# Patient Record
Sex: FEMALE | Race: WHITE | NOT HISPANIC OR LATINO | Employment: FULL TIME | RURAL
[De-identification: names, ages, dates, MRNs, and addresses within clinical notes are randomized per-mention and may not be internally consistent; named-entity substitution may affect disease eponyms.]

---

## 2020-07-07 ENCOUNTER — HISTORICAL (OUTPATIENT)
Dept: ADMINISTRATIVE | Facility: HOSPITAL | Age: 70
End: 2020-07-07

## 2020-09-22 ENCOUNTER — HISTORICAL (OUTPATIENT)
Dept: ADMINISTRATIVE | Facility: HOSPITAL | Age: 70
End: 2020-09-22

## 2020-12-16 ENCOUNTER — HISTORICAL (OUTPATIENT)
Dept: ADMINISTRATIVE | Facility: HOSPITAL | Age: 70
End: 2020-12-16

## 2020-12-21 ENCOUNTER — HISTORICAL (OUTPATIENT)
Dept: ADMINISTRATIVE | Facility: HOSPITAL | Age: 70
End: 2020-12-21

## 2021-01-05 ENCOUNTER — HISTORICAL (OUTPATIENT)
Dept: ADMINISTRATIVE | Facility: HOSPITAL | Age: 71
End: 2021-01-05

## 2021-01-07 LAB
DSDNA IGG SERPL IA-ACNC: 14 IU (ref 0–24)
DSDNA IGG SERPL IA-ACNC: NEGATIVE [IU]/ML
ENA AB SER QL IA: 2.8 EUS (ref 0–19.9)
ENA AB SER QL IA: NEGATIVE

## 2021-01-12 ENCOUNTER — HISTORICAL (OUTPATIENT)
Dept: ADMINISTRATIVE | Facility: HOSPITAL | Age: 71
End: 2021-01-12

## 2021-04-05 ENCOUNTER — OFFICE VISIT (OUTPATIENT)
Dept: NEUROLOGY | Facility: CLINIC | Age: 71
End: 2021-04-05
Payer: MEDICARE

## 2021-04-05 VITALS
HEART RATE: 77 BPM | HEIGHT: 66 IN | OXYGEN SATURATION: 97 % | WEIGHT: 137.81 LBS | SYSTOLIC BLOOD PRESSURE: 136 MMHG | DIASTOLIC BLOOD PRESSURE: 70 MMHG | BODY MASS INDEX: 22.15 KG/M2

## 2021-04-05 DIAGNOSIS — E78.5 HYPERLIPIDEMIA, UNSPECIFIED HYPERLIPIDEMIA TYPE: ICD-10-CM

## 2021-04-05 DIAGNOSIS — F80.1 EXPRESSIVE LANGUAGE DISORDER: ICD-10-CM

## 2021-04-05 DIAGNOSIS — G45.9 TIA (TRANSIENT ISCHEMIC ATTACK): Primary | ICD-10-CM

## 2021-04-05 PROCEDURE — 99213 PR OFFICE/OUTPT VISIT, EST, LEVL III, 20-29 MIN: ICD-10-PCS | Mod: S$PBB,,, | Performed by: NURSE PRACTITIONER

## 2021-04-05 PROCEDURE — 99214 OFFICE O/P EST MOD 30 MIN: CPT | Mod: PBBFAC | Performed by: NURSE PRACTITIONER

## 2021-04-05 PROCEDURE — 99999 PR PBB SHADOW E&M-EST. PATIENT-LVL IV: CPT | Mod: PBBFAC,,, | Performed by: NURSE PRACTITIONER

## 2021-04-05 PROCEDURE — 99213 OFFICE O/P EST LOW 20 MIN: CPT | Mod: S$PBB,,, | Performed by: NURSE PRACTITIONER

## 2021-04-05 PROCEDURE — 99999 PR PBB SHADOW E&M-EST. PATIENT-LVL IV: ICD-10-PCS | Mod: PBBFAC,,, | Performed by: NURSE PRACTITIONER

## 2021-04-05 RX ORDER — NAPROXEN SODIUM 220 MG/1
81 TABLET, FILM COATED ORAL DAILY
COMMUNITY

## 2021-04-05 RX ORDER — OMEPRAZOLE 20 MG/1
20 CAPSULE, DELAYED RELEASE ORAL DAILY
COMMUNITY
End: 2021-12-07 | Stop reason: ALTCHOICE

## 2021-04-05 RX ORDER — AZELASTINE 1 MG/ML
2 SPRAY, METERED NASAL 2 TIMES DAILY
COMMUNITY
Start: 2021-03-23

## 2021-04-05 RX ORDER — ZINC ACETATE 50 MG/1
1 CAPSULE ORAL DAILY
COMMUNITY
End: 2022-09-19

## 2021-04-05 RX ORDER — IBUPROFEN 200 MG
1 CAPSULE ORAL DAILY
COMMUNITY

## 2021-04-06 ENCOUNTER — HOSPITAL ENCOUNTER (OUTPATIENT)
Dept: RADIOLOGY | Facility: HOSPITAL | Age: 71
Discharge: HOME | End: 2021-04-06
Attending: NURSE PRACTITIONER
Payer: MEDICARE

## 2021-04-06 DIAGNOSIS — M85.89 OTHER SPECIFIED DISORDERS OF BONE DENSITY AND STRUCTURE, MULTIPLE SITES: ICD-10-CM

## 2021-04-06 PROCEDURE — 77080 DEXA BONE DENSITY SPINE HIP: ICD-10-PCS | Mod: 26,,, | Performed by: RADIOLOGY

## 2021-04-06 PROCEDURE — 77080 DXA BONE DENSITY AXIAL: CPT | Mod: 26,,, | Performed by: RADIOLOGY

## 2021-04-06 PROCEDURE — 77080 DXA BONE DENSITY AXIAL: CPT | Mod: TC

## 2021-07-13 ENCOUNTER — HOSPITAL ENCOUNTER (OUTPATIENT)
Dept: RADIOLOGY | Facility: HOSPITAL | Age: 71
Discharge: HOME | End: 2021-07-13
Payer: MEDICARE

## 2021-07-13 VITALS — HEIGHT: 66 IN | BODY MASS INDEX: 21.69 KG/M2 | WEIGHT: 135 LBS

## 2021-07-13 DIAGNOSIS — Z12.31 VISIT FOR SCREENING MAMMOGRAM: ICD-10-CM

## 2021-07-13 PROCEDURE — 77067 SCR MAMMO BI INCL CAD: CPT | Mod: TC

## 2021-09-27 ENCOUNTER — OFFICE VISIT (OUTPATIENT)
Dept: FAMILY MEDICINE | Facility: CLINIC | Age: 71
End: 2021-09-27
Payer: MEDICARE

## 2021-09-27 VITALS
BODY MASS INDEX: 22.47 KG/M2 | HEART RATE: 70 BPM | SYSTOLIC BLOOD PRESSURE: 120 MMHG | RESPIRATION RATE: 20 BRPM | WEIGHT: 139.81 LBS | OXYGEN SATURATION: 98 % | DIASTOLIC BLOOD PRESSURE: 78 MMHG | TEMPERATURE: 98 F | HEIGHT: 66 IN

## 2021-09-27 DIAGNOSIS — R14.2 BELCHING: ICD-10-CM

## 2021-09-27 DIAGNOSIS — K21.9 GASTROESOPHAGEAL REFLUX DISEASE, UNSPECIFIED WHETHER ESOPHAGITIS PRESENT: ICD-10-CM

## 2021-09-27 DIAGNOSIS — R19.06 EPIGASTRIC FULLNESS: ICD-10-CM

## 2021-09-27 DIAGNOSIS — R13.10 FOOD STICKS ON SWALLOWING: Primary | ICD-10-CM

## 2021-09-27 PROCEDURE — 99213 PR OFFICE/OUTPT VISIT, EST, LEVL III, 20-29 MIN: ICD-10-PCS | Mod: ,,, | Performed by: NURSE PRACTITIONER

## 2021-09-27 PROCEDURE — 99213 OFFICE O/P EST LOW 20 MIN: CPT | Mod: ,,, | Performed by: NURSE PRACTITIONER

## 2021-09-27 RX ORDER — PNV NO.95/FERROUS FUM/FOLIC AC 28MG-0.8MG
100 TABLET ORAL DAILY
COMMUNITY
End: 2022-09-19

## 2021-09-27 RX ORDER — IBUPROFEN 100 MG/5ML
1000 SUSPENSION, ORAL (FINAL DOSE FORM) ORAL DAILY
COMMUNITY
End: 2022-09-19

## 2021-10-12 ENCOUNTER — OFFICE VISIT (OUTPATIENT)
Dept: GASTROENTEROLOGY | Facility: CLINIC | Age: 71
End: 2021-10-12
Payer: MEDICARE

## 2021-10-12 VITALS
BODY MASS INDEX: 22.02 KG/M2 | DIASTOLIC BLOOD PRESSURE: 63 MMHG | WEIGHT: 137 LBS | OXYGEN SATURATION: 97 % | HEIGHT: 66 IN | SYSTOLIC BLOOD PRESSURE: 140 MMHG | HEART RATE: 96 BPM

## 2021-10-12 DIAGNOSIS — R10.13 EPIGASTRIC PAIN: Primary | ICD-10-CM

## 2021-10-12 DIAGNOSIS — Z11.59 SCREENING FOR VIRAL DISEASE: ICD-10-CM

## 2021-10-12 DIAGNOSIS — R19.06 EPIGASTRIC FULLNESS: ICD-10-CM

## 2021-10-12 DIAGNOSIS — R14.2 BELCHING: ICD-10-CM

## 2021-10-12 DIAGNOSIS — K21.9 GASTROESOPHAGEAL REFLUX DISEASE, UNSPECIFIED WHETHER ESOPHAGITIS PRESENT: ICD-10-CM

## 2021-10-12 DIAGNOSIS — R13.10 DYSPHAGIA, UNSPECIFIED TYPE: ICD-10-CM

## 2021-10-12 DIAGNOSIS — K59.00 CONSTIPATION, UNSPECIFIED CONSTIPATION TYPE: ICD-10-CM

## 2021-10-12 PROCEDURE — 99214 PR OFFICE/OUTPT VISIT, EST, LEVL IV, 30-39 MIN: ICD-10-PCS | Mod: ,,, | Performed by: NURSE PRACTITIONER

## 2021-10-12 PROCEDURE — 99214 OFFICE O/P EST MOD 30 MIN: CPT | Mod: ,,, | Performed by: NURSE PRACTITIONER

## 2021-11-08 DIAGNOSIS — Z01.818 PRE-OP TESTING: ICD-10-CM

## 2021-11-15 ENCOUNTER — ANESTHESIA (OUTPATIENT)
Dept: GASTROENTEROLOGY | Facility: HOSPITAL | Age: 71
End: 2021-11-15
Payer: MEDICARE

## 2021-11-15 ENCOUNTER — HOSPITAL ENCOUNTER (OUTPATIENT)
Dept: GASTROENTEROLOGY | Facility: HOSPITAL | Age: 71
Discharge: HOME | End: 2021-11-15
Attending: NURSE PRACTITIONER
Payer: MEDICARE

## 2021-11-15 ENCOUNTER — ANESTHESIA EVENT (OUTPATIENT)
Dept: GASTROENTEROLOGY | Facility: HOSPITAL | Age: 71
End: 2021-11-15
Payer: MEDICARE

## 2021-11-15 VITALS
TEMPERATURE: 98 F | DIASTOLIC BLOOD PRESSURE: 60 MMHG | SYSTOLIC BLOOD PRESSURE: 142 MMHG | HEART RATE: 72 BPM | OXYGEN SATURATION: 99 % | RESPIRATION RATE: 12 BRPM | BODY MASS INDEX: 22.18 KG/M2 | HEIGHT: 66 IN | WEIGHT: 138 LBS

## 2021-11-15 DIAGNOSIS — R68.81 EARLY SATIETY: ICD-10-CM

## 2021-11-15 DIAGNOSIS — R10.13 EPIGASTRIC PAIN: ICD-10-CM

## 2021-11-15 DIAGNOSIS — R14.2 BELCHING: ICD-10-CM

## 2021-11-15 DIAGNOSIS — R13.10 DYSPHAGIA, UNSPECIFIED TYPE: ICD-10-CM

## 2021-11-15 DIAGNOSIS — R13.19 ESOPHAGEAL DYSPHAGIA: ICD-10-CM

## 2021-11-15 PROCEDURE — 25000003 PHARM REV CODE 250: Performed by: NURSE ANESTHETIST, CERTIFIED REGISTERED

## 2021-11-15 PROCEDURE — D9220A PRA ANESTHESIA: ICD-10-PCS | Mod: ,,, | Performed by: NURSE ANESTHETIST, CERTIFIED REGISTERED

## 2021-11-15 PROCEDURE — 88342 SURGICAL PATHOLOGY: ICD-10-PCS | Mod: 26,,, | Performed by: PATHOLOGY

## 2021-11-15 PROCEDURE — 88342 IMHCHEM/IMCYTCHM 1ST ANTB: CPT | Mod: 26,,, | Performed by: PATHOLOGY

## 2021-11-15 PROCEDURE — 63600175 PHARM REV CODE 636 W HCPCS: Performed by: NURSE ANESTHETIST, CERTIFIED REGISTERED

## 2021-11-15 PROCEDURE — C1889 IMPLANT/INSERT DEVICE, NOC: HCPCS

## 2021-11-15 PROCEDURE — 37000009 HC ANESTHESIA EA ADD 15 MINS

## 2021-11-15 PROCEDURE — 88305 TISSUE EXAM BY PATHOLOGIST: CPT | Mod: SUR | Performed by: INTERNAL MEDICINE

## 2021-11-15 PROCEDURE — D9220A PRA ANESTHESIA: Mod: ,,, | Performed by: NURSE ANESTHETIST, CERTIFIED REGISTERED

## 2021-11-15 PROCEDURE — 88305 TISSUE EXAM BY PATHOLOGIST: CPT | Mod: 26,,, | Performed by: PATHOLOGY

## 2021-11-15 PROCEDURE — 27201423 OPTIME MED/SURG SUP & DEVICES STERILE SUPPLY

## 2021-11-15 PROCEDURE — 43239 EGD BIOPSY SINGLE/MULTIPLE: CPT

## 2021-11-15 PROCEDURE — 37000008 HC ANESTHESIA 1ST 15 MINUTES

## 2021-11-15 PROCEDURE — 43450 DILATE ESOPHAGUS 1/MULT PASS: CPT

## 2021-11-15 PROCEDURE — 88305 SURGICAL PATHOLOGY: ICD-10-PCS | Mod: 26,XU,, | Performed by: PATHOLOGY

## 2021-11-15 RX ORDER — LIDOCAINE HYDROCHLORIDE 20 MG/ML
INJECTION INTRAVENOUS
Status: DISCONTINUED | OUTPATIENT
Start: 2021-11-15 | End: 2021-11-15

## 2021-11-15 RX ORDER — PROPOFOL 10 MG/ML
VIAL (ML) INTRAVENOUS
Status: DISCONTINUED | OUTPATIENT
Start: 2021-11-15 | End: 2021-11-15

## 2021-11-15 RX ORDER — SODIUM CHLORIDE 0.9 % (FLUSH) 0.9 %
10 SYRINGE (ML) INJECTION
Status: DISCONTINUED | OUTPATIENT
Start: 2021-11-15 | End: 2021-11-16 | Stop reason: HOSPADM

## 2021-11-15 RX ADMIN — PROPOFOL 50 MG: 10 INJECTION, EMULSION INTRAVENOUS at 09:11

## 2021-11-15 RX ADMIN — PROPOFOL 100 MG: 10 INJECTION, EMULSION INTRAVENOUS at 09:11

## 2021-11-15 RX ADMIN — SODIUM CHLORIDE: 9 INJECTION, SOLUTION INTRAVENOUS at 09:11

## 2021-11-15 RX ADMIN — LIDOCAINE HYDROCHLORIDE 50 MG: 20 INJECTION, SOLUTION INTRAVENOUS at 09:11

## 2021-11-16 LAB
ESTROGEN SERPL-MCNC: NORMAL PG/ML
LAB AP GROSS DESCRIPTION: NORMAL
LAB AP LABORATORY NOTES: NORMAL
T3RU NFR SERPL: NORMAL %

## 2021-11-17 ENCOUNTER — TELEPHONE (OUTPATIENT)
Dept: GASTROENTEROLOGY | Facility: CLINIC | Age: 71
End: 2021-11-17
Payer: MEDICARE

## 2021-12-07 ENCOUNTER — OFFICE VISIT (OUTPATIENT)
Dept: GASTROENTEROLOGY | Facility: CLINIC | Age: 71
End: 2021-12-07
Payer: MEDICARE

## 2021-12-07 VITALS
DIASTOLIC BLOOD PRESSURE: 74 MMHG | WEIGHT: 137 LBS | SYSTOLIC BLOOD PRESSURE: 141 MMHG | OXYGEN SATURATION: 98 % | HEIGHT: 66 IN | BODY MASS INDEX: 22.02 KG/M2 | HEART RATE: 91 BPM

## 2021-12-07 DIAGNOSIS — K21.9 GASTROESOPHAGEAL REFLUX DISEASE, UNSPECIFIED WHETHER ESOPHAGITIS PRESENT: ICD-10-CM

## 2021-12-07 DIAGNOSIS — R10.13 EPIGASTRIC PAIN: Primary | ICD-10-CM

## 2021-12-07 DIAGNOSIS — R68.81 EARLY SATIETY: ICD-10-CM

## 2021-12-07 DIAGNOSIS — K59.00 CONSTIPATION, UNSPECIFIED CONSTIPATION TYPE: ICD-10-CM

## 2021-12-07 DIAGNOSIS — R14.2 BELCHING: ICD-10-CM

## 2021-12-07 PROCEDURE — 99214 OFFICE O/P EST MOD 30 MIN: CPT | Mod: ,,, | Performed by: NURSE PRACTITIONER

## 2021-12-07 PROCEDURE — 99214 PR OFFICE/OUTPT VISIT, EST, LEVL IV, 30-39 MIN: ICD-10-PCS | Mod: ,,, | Performed by: NURSE PRACTITIONER

## 2021-12-07 RX ORDER — PNV NO.95/FERROUS FUM/FOLIC AC 28MG-0.8MG
1000 TABLET ORAL DAILY
COMMUNITY
End: 2022-09-19

## 2021-12-07 RX ORDER — PANTOPRAZOLE SODIUM 40 MG/1
40 TABLET, DELAYED RELEASE ORAL DAILY
Qty: 30 TABLET | Refills: 11 | Status: SHIPPED | OUTPATIENT
Start: 2021-12-07 | End: 2022-09-19

## 2021-12-07 RX ORDER — NICOTINE POLACRILEX 2 MG
GUM BUCCAL DAILY
COMMUNITY
End: 2022-09-19

## 2021-12-21 ENCOUNTER — HOSPITAL ENCOUNTER (OUTPATIENT)
Dept: RADIOLOGY | Facility: HOSPITAL | Age: 71
Discharge: HOME | End: 2021-12-21
Attending: INTERNAL MEDICINE
Payer: MEDICARE

## 2021-12-21 ENCOUNTER — TELEPHONE (OUTPATIENT)
Dept: GASTROENTEROLOGY | Facility: CLINIC | Age: 71
End: 2021-12-21
Payer: MEDICARE

## 2021-12-21 DIAGNOSIS — R14.2 BELCHING: ICD-10-CM

## 2021-12-21 DIAGNOSIS — R10.13 EPIGASTRIC PAIN: ICD-10-CM

## 2021-12-21 PROCEDURE — 76700 US EXAM ABDOM COMPLETE: CPT | Mod: 26,,, | Performed by: RADIOLOGY

## 2021-12-21 PROCEDURE — 76700 US EXAM ABDOM COMPLETE: CPT | Mod: TC

## 2021-12-21 PROCEDURE — 76700 US ABDOMEN COMPLETE: ICD-10-PCS | Mod: 26,,, | Performed by: RADIOLOGY

## 2022-01-11 ENCOUNTER — OFFICE VISIT (OUTPATIENT)
Dept: GASTROENTEROLOGY | Facility: CLINIC | Age: 72
End: 2022-01-11
Payer: MEDICARE

## 2022-01-11 VITALS
SYSTOLIC BLOOD PRESSURE: 132 MMHG | BODY MASS INDEX: 21.69 KG/M2 | OXYGEN SATURATION: 98 % | WEIGHT: 135 LBS | DIASTOLIC BLOOD PRESSURE: 50 MMHG | HEIGHT: 66 IN | HEART RATE: 88 BPM

## 2022-01-11 DIAGNOSIS — R68.81 EARLY SATIETY: ICD-10-CM

## 2022-01-11 DIAGNOSIS — K21.9 GASTROESOPHAGEAL REFLUX DISEASE, UNSPECIFIED WHETHER ESOPHAGITIS PRESENT: Primary | ICD-10-CM

## 2022-01-11 DIAGNOSIS — R19.06 EPIGASTRIC FULLNESS: ICD-10-CM

## 2022-01-11 PROCEDURE — 99214 PR OFFICE/OUTPT VISIT, EST, LEVL IV, 30-39 MIN: ICD-10-PCS | Mod: ,,, | Performed by: NURSE PRACTITIONER

## 2022-01-11 PROCEDURE — 99214 OFFICE O/P EST MOD 30 MIN: CPT | Mod: ,,, | Performed by: NURSE PRACTITIONER

## 2022-01-11 RX ORDER — OMEPRAZOLE 20 MG/1
20 CAPSULE, DELAYED RELEASE ORAL DAILY
Qty: 30 CAPSULE | Refills: 11 | Status: SHIPPED | OUTPATIENT
Start: 2022-01-11 | End: 2022-11-01 | Stop reason: DRUGHIGH

## 2022-01-11 NOTE — PROGRESS NOTES
Lynne Grimaldo is a 71 y.o. female here for No chief complaint on file.        PCP: Kristyn Tavera  Referring Provider: No referring provider defined for this encounter.     HPI:  Presents for follow up epigastric discomfort and fullness. She was changed to Protonix at the last visit. She states that the Protonix caused her to have nausea. States that she was waking up at night with nausea. She stopped Protonix and started Omeprazole. Does continue to have epigastric discomfort that is increased after eating. Discussed with patient small frequent meals and avoiding raw fruit and vegetables. Does have reflux. Abdominal ultrasound normal. No gallstones. Reports constipation is controlled with taking Miralax powder. Denies hematochezia and melena.        ROS:  Review of Systems   Constitutional: Negative for appetite change, fatigue, fever and unexpected weight change.   HENT: Negative for trouble swallowing.    Respiratory: Negative for shortness of breath and wheezing.    Cardiovascular: Negative for chest pain and palpitations.   Gastrointestinal: Positive for abdominal pain (epigastric fullness), constipation (takes Miralax) and reflux. Negative for blood in stool, change in bowel habit, diarrhea, nausea, vomiting, fecal incontinence and change in bowel habit.   Genitourinary: Negative for dysuria and urgency.   Musculoskeletal: Negative for back pain, gait problem and joint swelling.   Integumentary:  Negative for pallor.   Neurological: Negative for dizziness, weakness and light-headedness.   Hematological: Does not bruise/bleed easily.   Psychiatric/Behavioral: The patient is not nervous/anxious.           PMHX:  has a past medical history of Back pain, COVID-19 (07/2021), GERD (gastroesophageal reflux disease), Hyperlipidemia, and Stroke.    PSHX:  has a past surgical history that includes Appendectomy and Back surgery.    PFHX: family history includes Arthritis in her maternal grandfather; Cancer  "in her father; Diabetes in her brother; No Known Problems in her sister; Spine Surgery in her mother.    PSlHX:  reports that she has never smoked. She has never used smokeless tobacco. She reports previous alcohol use. She reports that she does not use drugs.        Review of patient's allergies indicates:   Allergen Reactions    Sulfa (sulfonamide antibiotics) Hives and Edema       Medication List with Changes/Refills   New Medications    OMEPRAZOLE (PRILOSEC) 20 MG CAPSULE    Take 1 capsule (20 mg total) by mouth once daily.   Current Medications    ASCORBIC ACID, VITAMIN C, (VITAMIN C) 1000 MG TABLET    Take 1,000 mg by mouth once daily.    ASPIRIN 81 MG CHEW    Take 81 mg by mouth once daily.    AZELASTINE (ASTELIN) 137 MCG (0.1 %) NASAL SPRAY        BIOTIN 1 MG CAP    Take by mouth once daily.    CALCIUM CITRATE (CALCITRATE) 200 MG (950 MG) TABLET    Take 2 tablets by mouth 2 (two) times daily.     CHOLECALCIFEROL, VITAMIN D3, ORAL    Take 1 capsule by mouth once daily.    CYANOCOBALAMIN (VITAMIN B-12) 100 MCG TABLET    Take 100 mcg by mouth once daily.    MULTIVITAMIN CAPSULE    Take 1 capsule by mouth once daily.    PANTOPRAZOLE (PROTONIX) 40 MG TABLET    Take 1 tablet (40 mg total) by mouth once daily.    SOY ISOFL/BLK COH/GR TEA/YERBA (ESTROVEN ENERGY ORAL)    Take 1 tablet by mouth once daily.    VITAMIN E 1000 UNIT CAPSULE    Take 1,000 Units by mouth once daily.    ZINC ACETATE (GALZIN) 50 MG (ZINC) CAP    Take 1 capsule by mouth once daily.        Objective Findings:  Vital Signs:  BP (!) 132/50   Pulse 88   Ht 5' 6" (1.676 m)   Wt 61.2 kg (135 lb)   SpO2 98%   BMI 21.79 kg/m²  Body mass index is 21.79 kg/m².    Physical Exam:  Physical Exam  Vitals and nursing note reviewed.   Constitutional:       General: She is not in acute distress.     Appearance: Normal appearance. She is not ill-appearing.   HENT:      Mouth/Throat:      Mouth: Mucous membranes are moist.   Eyes:      General: No scleral " icterus.  Cardiovascular:      Rate and Rhythm: Normal rate and regular rhythm.      Heart sounds: No murmur heard.      Pulmonary:      Effort: Pulmonary effort is normal.      Breath sounds: No wheezing, rhonchi or rales.   Abdominal:      General: Bowel sounds are normal. There is no distension.      Palpations: Abdomen is soft. There is no mass.      Tenderness: There is no abdominal tenderness. There is no guarding or rebound.      Hernia: No hernia is present.   Musculoskeletal:      Right lower leg: No edema.      Left lower leg: No edema.   Skin:     General: Skin is warm and dry.      Coloration: Skin is not jaundiced or pale.      Findings: No bruising or rash.   Neurological:      Mental Status: She is alert and oriented to person, place, and time.   Psychiatric:         Mood and Affect: Mood normal.          Labs:  Lab Results   Component Value Date    WBC 6.23 10/12/2021    HGB 14.0 10/12/2021    HCT 42.7 10/12/2021    MCV 89.9 10/12/2021    RDW 13.2 10/12/2021     10/12/2021    LYMPH 29.5 10/12/2021    LYMPH 1.84 10/12/2021    MONO 8.2 (H) 10/12/2021    EOS 0.25 10/12/2021    BASO 0.07 10/12/2021     Lab Results   Component Value Date     10/12/2021    K 3.8 10/12/2021     10/12/2021    CO2 31 10/12/2021     (H) 10/12/2021    BUN 26 (H) 10/12/2021    CREATININE 1.49 (H) 10/12/2021    CALCIUM 10.2 (H) 10/12/2021    PROT 7.6 10/12/2021    ALBUMIN 4.2 10/12/2021    BILITOT 0.4 10/12/2021    ALKPHOS 113 10/12/2021    AST 26 10/12/2021    ALT 28 10/12/2021         Imaging: US Abdomen Complete    Result Date: 12/21/2021  EXAMINATION: US ABDOMEN COMPLETE CLINICAL HISTORY: Epigastric pain COMPARISON: Renal ultrasound August 29, 2018 TECHNIQUE: Multiple longitudinal and transverse real-time sonographic images of the abdomen are obtained. FINDINGS: The liver measures 12.9 cm and demonstrates normal  echogenicity without focal abnormality on submitted images. The gallbladder demonstrates  no significant abnormal distention, abnormal intraluminal echoes, wall thickening, or pericholecystic fluid. The common duct measures 0.3 cm in diameter. There is no evidence of intrahepatic ductal dilatation. The right and left kidneys measure 10.7 cm and 10.0 cm, respectively. There is no hydronephrosis. The spleen measures 9.3 cm without focal abnormality. Evaluation of the pancreas limited secondary to bowel gas. IVC and aorta: Visualized portions grossly unremarkable. No evidence of ascites.     No convincing sonographic evidence of significant abdominal abnormality. Point of Service: [facility] Electronically signed by: Edward Fermin Date:    12/21/2021 Time:    09:29        Assessment:  Lynne Grimaldo is a 71 y.o. female here with:  1. Gastroesophageal reflux disease, unspecified whether esophagitis present    2. Epigastric fullness    3. Early satiety          Recommendations:  1. Schedule Hida scan. (abdominal ultrasound normal)  2. Continue Omeprazole 20 mg daily  3. Small frequent meals. Do not lay down within 3 hours of eating. Avoid raw fruits and vegetables. No spicy, greasy, or high fat foods    Follow up in about 2 months (around 3/11/2022).      Order summary:  Orders Placed This Encounter    NM Hepatobiliary Scan W Pharm Intervention    omeprazole (PRILOSEC) 20 MG capsule       Thank you for allowing me to participate in the care of Lynne Grimaldo.      SHANNAN Grayson

## 2022-01-15 ENCOUNTER — OFFICE VISIT (OUTPATIENT)
Dept: FAMILY MEDICINE | Facility: CLINIC | Age: 72
End: 2022-01-15
Payer: MEDICARE

## 2022-01-15 VITALS
HEIGHT: 63 IN | TEMPERATURE: 98 F | DIASTOLIC BLOOD PRESSURE: 82 MMHG | HEART RATE: 96 BPM | OXYGEN SATURATION: 99 % | WEIGHT: 139 LBS | BODY MASS INDEX: 24.63 KG/M2 | SYSTOLIC BLOOD PRESSURE: 140 MMHG

## 2022-01-15 DIAGNOSIS — U07.1 COVID-19: Primary | ICD-10-CM

## 2022-01-15 DIAGNOSIS — J40 BRONCHITIS: ICD-10-CM

## 2022-01-15 PROCEDURE — 99213 OFFICE O/P EST LOW 20 MIN: CPT | Mod: 25,CR,, | Performed by: NURSE PRACTITIONER

## 2022-01-15 PROCEDURE — 99213 PR OFFICE/OUTPT VISIT, EST, LEVL III, 20-29 MIN: ICD-10-PCS | Mod: 25,CR,, | Performed by: NURSE PRACTITIONER

## 2022-01-15 PROCEDURE — 96372 THER/PROPH/DIAG INJ SC/IM: CPT | Mod: CR,,, | Performed by: NURSE PRACTITIONER

## 2022-01-15 PROCEDURE — 96372 PR INJECTION,THERAP/PROPH/DIAG2ST, IM OR SUBCUT: ICD-10-PCS | Mod: CR,,, | Performed by: NURSE PRACTITIONER

## 2022-01-15 RX ORDER — CODEINE PHOSPHATE AND GUAIFENESIN 10; 100 MG/5ML; MG/5ML
SOLUTION ORAL
Qty: 120 ML | Refills: 0 | Status: SHIPPED | OUTPATIENT
Start: 2022-01-15 | End: 2022-11-01

## 2022-01-15 RX ORDER — KETOROLAC TROMETHAMINE 30 MG/ML
30 INJECTION, SOLUTION INTRAMUSCULAR; INTRAVENOUS
Status: COMPLETED | OUTPATIENT
Start: 2022-01-15 | End: 2022-01-15

## 2022-01-15 RX ORDER — DEXAMETHASONE SODIUM PHOSPHATE 4 MG/ML
4 INJECTION, SOLUTION INTRA-ARTICULAR; INTRALESIONAL; INTRAMUSCULAR; INTRAVENOUS; SOFT TISSUE
Status: COMPLETED | OUTPATIENT
Start: 2022-01-15 | End: 2022-01-15

## 2022-01-15 RX ORDER — CEFDINIR 300 MG/1
300 CAPSULE ORAL 2 TIMES DAILY
Qty: 20 CAPSULE | Refills: 0 | Status: SHIPPED | OUTPATIENT
Start: 2022-01-15 | End: 2022-01-25

## 2022-01-15 RX ADMIN — KETOROLAC TROMETHAMINE 30 MG: 30 INJECTION, SOLUTION INTRAMUSCULAR; INTRAVENOUS at 10:01

## 2022-01-15 RX ADMIN — DEXAMETHASONE SODIUM PHOSPHATE 4 MG: 4 INJECTION, SOLUTION INTRA-ARTICULAR; INTRALESIONAL; INTRAMUSCULAR; INTRAVENOUS; SOFT TISSUE at 10:01

## 2022-01-15 NOTE — PROGRESS NOTES
"Subjective:       Patient ID: Lynne Grimaldo is a 71 y.o. female.    Chief Complaint: Tested positive for Covid19 Wednesday, Sore Throat, and Cough    Presents to clinic as above. Her cough is severe. No SOB. Has wheezing. Hx of asthma. Strongly wants an antibiotic. Inst the viruses do not respond to antibiotics. States thinks has a secondary infection.  got a Zpack for Covid. Discussed risks of using antibiotic when not needed. Willing to take the risk.    Review of Systems   Constitutional: Positive for chills, fever and malaise/fatigue.   HENT: Positive for congestion and sore throat. Negative for ear pain.    Respiratory: Positive for cough. Negative for hemoptysis, sputum production, shortness of breath and wheezing.    Cardiovascular: Negative.    Gastrointestinal: Negative.    Musculoskeletal: Positive for myalgias.   Neurological: Positive for headaches.          Reviewed family, medical, surgical, and social history.    Objective:      BP (!) 140/82   Pulse 96   Temp 97.6 °F (36.4 °C)   Ht 5' 3" (1.6 m)   Wt 63 kg (139 lb)   SpO2 99%   BMI 24.62 kg/m²   Physical Exam  Vitals and nursing note reviewed.   Constitutional:       General: She is not in acute distress.     Appearance: Normal appearance. She is not ill-appearing or diaphoretic.   HENT:      Head: Normocephalic.      Right Ear: Tympanic membrane, ear canal and external ear normal.      Left Ear: Tympanic membrane, ear canal and external ear normal.      Nose: Mucosal edema, congestion and rhinorrhea present. Rhinorrhea is clear.      Right Turbinates: Enlarged and swollen.      Left Turbinates: Enlarged and swollen.      Mouth/Throat:      Lips: Pink.      Mouth: Mucous membranes are moist.      Pharynx: Oropharynx is clear.   Cardiovascular:      Rate and Rhythm: Normal rate and regular rhythm.      Pulses: Normal pulses.      Heart sounds: Normal heart sounds.   Pulmonary:      Effort: Pulmonary effort is normal.      Breath " sounds: Wheezing and rhonchi present.   Musculoskeletal:      Cervical back: Normal range of motion and neck supple.   Skin:     General: Skin is warm and dry.      Capillary Refill: Capillary refill takes less than 2 seconds.   Neurological:      General: No focal deficit present.      Mental Status: She is alert and oriented to person, place, and time.   Psychiatric:         Mood and Affect: Mood normal.         Behavior: Behavior normal.         Thought Content: Thought content normal.         Judgment: Judgment normal.            No visits with results within 1 Day(s) from this visit.   Latest known visit with results is:   Hospital Outpatient Visit on 11/15/2021   Component Date Value Ref Range Status    Final Diagnosis 11/15/2021    Final                    Value:This result contains rich text formatting which cannot be displayed here.    Gross Description 11/15/2021    Final                    Value:This result contains rich text formatting which cannot be displayed here.    Microscopic Description 11/15/2021    Final                    Value:This result contains rich text formatting which cannot be displayed here.    Laboratory Notes 11/15/2021    Final                    Value:This result contains rich text formatting which cannot be displayed here.      Assessment:       1. COVID-19    2. Bronchitis        Plan:       COVID-19  -     dexamethasone injection 4 mg  -     ketorolac injection 30 mg  -     guaiFENesin-codeine 100-10 mg/5 ml (TUSSI-ORGANIDIN NR)  mg/5 mL syrup; Take 1-2 tsp by mouth q 6 hours prn cough  Dispense: 120 mL; Refill: 0    Bronchitis  -     cefdinir (OMNICEF) 300 MG capsule; Take 1 capsule (300 mg total) by mouth 2 (two) times daily. for 10 days  Dispense: 20 capsule; Refill: 0  -     dexamethasone injection 4 mg  -     ketorolac injection 30 mg  -     guaiFENesin-codeine 100-10 mg/5 ml (TUSSI-ORGANIDIN NR)  mg/5 mL syrup; Take 1-2 tsp by mouth q 6 hours prn cough   Dispense: 120 mL; Refill: 0    Continue quarantine  OTC meds for symptomatic relief  Drink plenty of fluids  Go to ER with any respiratory distress  Copy of result and work/school note given. Copy of these instructions given  RTC PRN          Risks, benefits, and side effects were discussed with the patient. All questions were answered to the fullest satisfaction of the patient, and pt verbalized understanding and agreement to treatment plan. Pt was to call with any new or worsening symptoms, or present to the ER.

## 2022-01-17 ENCOUNTER — HOSPITAL ENCOUNTER (OUTPATIENT)
Dept: RADIOLOGY | Facility: HOSPITAL | Age: 72
Discharge: HOME | End: 2022-01-17
Attending: NURSE PRACTITIONER
Payer: MEDICARE

## 2022-01-17 DIAGNOSIS — R19.06 EPIGASTRIC FULLNESS: ICD-10-CM

## 2022-01-17 DIAGNOSIS — K21.9 GASTROESOPHAGEAL REFLUX DISEASE, UNSPECIFIED WHETHER ESOPHAGITIS PRESENT: ICD-10-CM

## 2022-01-17 DIAGNOSIS — R68.81 EARLY SATIETY: ICD-10-CM

## 2022-01-17 PROCEDURE — 78227 NM HEPATOBILIARY(HIDA) WITH PHARM AND EF: ICD-10-PCS | Mod: 26,,, | Performed by: STUDENT IN AN ORGANIZED HEALTH CARE EDUCATION/TRAINING PROGRAM

## 2022-01-17 PROCEDURE — A9537 TC99M MEBROFENIN: HCPCS

## 2022-01-17 PROCEDURE — 78227 HEPATOBIL SYST IMAGE W/DRUG: CPT | Mod: 26,,, | Performed by: STUDENT IN AN ORGANIZED HEALTH CARE EDUCATION/TRAINING PROGRAM

## 2022-01-20 ENCOUNTER — TELEPHONE (OUTPATIENT)
Dept: GASTROENTEROLOGY | Facility: CLINIC | Age: 72
End: 2022-01-20
Payer: MEDICARE

## 2022-01-20 NOTE — TELEPHONE ENCOUNTER
Called HIDA scan results. Patient verbalzied understanding.      ----- Message from SHANNAN Nolan sent at 1/17/2022 12:01 PM CST -----  Gallbladder function is normal.

## 2022-01-31 ENCOUNTER — OFFICE VISIT (OUTPATIENT)
Dept: FAMILY MEDICINE | Facility: CLINIC | Age: 72
End: 2022-01-31
Payer: MEDICARE

## 2022-01-31 VITALS
SYSTOLIC BLOOD PRESSURE: 132 MMHG | BODY MASS INDEX: 24.21 KG/M2 | WEIGHT: 136.63 LBS | RESPIRATION RATE: 20 BRPM | DIASTOLIC BLOOD PRESSURE: 78 MMHG | HEART RATE: 85 BPM | OXYGEN SATURATION: 97 % | TEMPERATURE: 98 F | HEIGHT: 63 IN

## 2022-01-31 DIAGNOSIS — J32.9 SINUSITIS, UNSPECIFIED CHRONICITY, UNSPECIFIED LOCATION: Primary | ICD-10-CM

## 2022-01-31 PROCEDURE — 96372 THER/PROPH/DIAG INJ SC/IM: CPT | Mod: ,,, | Performed by: NURSE PRACTITIONER

## 2022-01-31 PROCEDURE — 99213 OFFICE O/P EST LOW 20 MIN: CPT | Mod: ,,, | Performed by: NURSE PRACTITIONER

## 2022-01-31 PROCEDURE — 96372 PR INJECTION,THERAP/PROPH/DIAG2ST, IM OR SUBCUT: ICD-10-PCS | Mod: ,,, | Performed by: NURSE PRACTITIONER

## 2022-01-31 PROCEDURE — 99213 PR OFFICE/OUTPT VISIT, EST, LEVL III, 20-29 MIN: ICD-10-PCS | Mod: ,,, | Performed by: NURSE PRACTITIONER

## 2022-01-31 RX ORDER — AMOXICILLIN AND CLAVULANATE POTASSIUM 875; 125 MG/1; MG/1
1 TABLET, FILM COATED ORAL 2 TIMES DAILY
Qty: 20 TABLET | Refills: 0 | Status: SHIPPED | OUTPATIENT
Start: 2022-01-31 | End: 2022-02-10

## 2022-01-31 RX ORDER — CEFTRIAXONE 1 G/1
1 INJECTION, POWDER, FOR SOLUTION INTRAMUSCULAR; INTRAVENOUS
Status: COMPLETED | OUTPATIENT
Start: 2022-01-31 | End: 2022-01-31

## 2022-01-31 RX ORDER — METHYLPREDNISOLONE ACETATE 40 MG/ML
40 INJECTION, SUSPENSION INTRA-ARTICULAR; INTRALESIONAL; INTRAMUSCULAR; SOFT TISSUE
Status: COMPLETED | OUTPATIENT
Start: 2022-01-31 | End: 2022-01-31

## 2022-01-31 RX ORDER — DEXAMETHASONE SODIUM PHOSPHATE 4 MG/ML
4 INJECTION, SOLUTION INTRA-ARTICULAR; INTRALESIONAL; INTRAMUSCULAR; INTRAVENOUS; SOFT TISSUE
Status: COMPLETED | OUTPATIENT
Start: 2022-01-31 | End: 2022-01-31

## 2022-01-31 RX ADMIN — METHYLPREDNISOLONE ACETATE 40 MG: 40 INJECTION, SUSPENSION INTRA-ARTICULAR; INTRALESIONAL; INTRAMUSCULAR; SOFT TISSUE at 11:01

## 2022-01-31 RX ADMIN — CEFTRIAXONE 1 G: 1 INJECTION, POWDER, FOR SOLUTION INTRAMUSCULAR; INTRAVENOUS at 11:01

## 2022-01-31 RX ADMIN — DEXAMETHASONE SODIUM PHOSPHATE 4 MG: 4 INJECTION, SOLUTION INTRA-ARTICULAR; INTRALESIONAL; INTRAMUSCULAR; INTRAVENOUS; SOFT TISSUE at 11:01

## 2022-01-31 NOTE — PROGRESS NOTES
Subjective:       Patient ID: Lynne Grimaldo is a 71 y.o. female.    Chief Complaint: URI (Covid + 3 weeks ago. Never got better. Congestion, drainage, sinus pressure, and cough. No fever. Has tried several OTC without relief.)    Pt presents with upper resp symptoms x 3 weeks.     Review of Systems   Constitutional: Negative for activity change, appetite change, chills, fatigue and fever.   HENT: Positive for nasal congestion, postnasal drip, rhinorrhea and sinus pressure/congestion. Negative for ear discharge, nosebleeds, sneezing, sore throat and tinnitus.    Eyes: Negative for pain, discharge, redness and itching.   Respiratory: Positive for cough. Negative for choking, chest tightness, shortness of breath and wheezing.    Cardiovascular: Negative for chest pain.   Gastrointestinal: Negative for abdominal distention, abdominal pain, blood in stool, change in bowel habit, constipation, diarrhea, nausea, vomiting and change in bowel habit.   Genitourinary: Negative for decreased urine volume, dysuria, flank pain and frequency.   Musculoskeletal: Negative for back pain and gait problem.   Integumentary:  Negative for wound, breast mass and breast discharge.   Allergic/Immunologic: Negative for immunocompromised state.   Neurological: Negative for dizziness, light-headedness and headaches.   Psychiatric/Behavioral: Negative for agitation, behavioral problems and hallucinations.   Breast: Negative for mass        Objective:      Physical Exam  Vitals and nursing note reviewed.   Constitutional:       Appearance: Normal appearance.   HENT:      Head: Normocephalic.      Right Ear: Tympanic membrane normal.      Left Ear: Tympanic membrane normal.      Nose: Congestion present.   Eyes:      Conjunctiva/sclera: Conjunctivae normal.      Pupils: Pupils are equal, round, and reactive to light.   Cardiovascular:      Rate and Rhythm: Normal rate and regular rhythm.      Heart sounds: Normal heart sounds.   Pulmonary:       Effort: Pulmonary effort is normal.      Breath sounds: Normal breath sounds.   Musculoskeletal:         General: Normal range of motion.   Neurological:      Mental Status: She is alert and oriented to person, place, and time.   Psychiatric:         Behavior: Behavior normal.         Assessment:       Problem List Items Addressed This Visit    None     Visit Diagnoses     Sinusitis, unspecified chronicity, unspecified location    -  Primary    Relevant Medications    dexamethasone injection 4 mg (Start on 1/31/2022 11:45 AM)    methylPREDNISolone acetate injection 40 mg (Start on 1/31/2022 11:45 AM)    cefTRIAXone injection 1 g (Start on 1/31/2022 11:45 AM)    amoxicillin-clavulanate 875-125mg (AUGMENTIN) 875-125 mg per tablet          Plan:       Notify clinic if symptoms worsen or persist.

## 2022-02-04 ENCOUNTER — TELEPHONE (OUTPATIENT)
Dept: FAMILY MEDICINE | Facility: CLINIC | Age: 72
End: 2022-02-04
Payer: MEDICARE

## 2022-02-04 RX ORDER — METHYLPREDNISOLONE 4 MG/1
TABLET ORAL
Qty: 21 TABLET | Refills: 0 | Status: SHIPPED | OUTPATIENT
Start: 2022-02-04 | End: 2022-09-19

## 2022-02-04 NOTE — TELEPHONE ENCOUNTER
"Spoke to pt. She was seen 1/31/22. She was give rocephin, decadron, and depo medrol injections while in the clinic. She was also given amoxicillin abx to take at home.    Pt states she is not better. She has been taking abx as prescribed but does not understand why Fany did not give her a steroid dose omar.     She wanted me to send message to you and see if you could give her one and states "Kristyn always gives me a steroid to take at home and I dont know why Fany wouldn't."    I explained that I was unsure what Kristyn could do since she saw Fany but that I would talk to her and let her know.  "

## 2022-02-04 NOTE — TELEPHONE ENCOUNTER
----- Message from Mary Whipple sent at 2/3/2022  3:54 PM CST -----  Pt stated that she was in here on Monday, she received an antibiotic and two shots. She stated that she still feels bad, if there was something else she could get to clear it up       Monica box    [phone]

## 2022-02-04 NOTE — TELEPHONE ENCOUNTER
Yes I will send her one. She gave a steroid shot with short and longer acting steroid, and would typically give decadron followed by a steroid omar in her case.

## 2022-03-11 DIAGNOSIS — Z71.89 COMPLEX CARE COORDINATION: ICD-10-CM

## 2022-07-19 ENCOUNTER — HOSPITAL ENCOUNTER (OUTPATIENT)
Dept: RADIOLOGY | Facility: HOSPITAL | Age: 72
Discharge: HOME | End: 2022-07-19
Attending: NURSE PRACTITIONER
Payer: MEDICARE

## 2022-07-19 DIAGNOSIS — Z12.31 BREAST CANCER SCREENING BY MAMMOGRAM: ICD-10-CM

## 2022-07-19 PROCEDURE — 77067 SCR MAMMO BI INCL CAD: CPT | Mod: TC

## 2022-09-19 ENCOUNTER — TELEPHONE (OUTPATIENT)
Dept: FAMILY MEDICINE | Facility: CLINIC | Age: 72
End: 2022-09-19
Payer: MEDICARE

## 2022-09-19 ENCOUNTER — OFFICE VISIT (OUTPATIENT)
Dept: FAMILY MEDICINE | Facility: CLINIC | Age: 72
End: 2022-09-19
Payer: MEDICARE

## 2022-09-19 VITALS
HEIGHT: 63 IN | TEMPERATURE: 98 F | WEIGHT: 136.38 LBS | OXYGEN SATURATION: 98 % | DIASTOLIC BLOOD PRESSURE: 74 MMHG | BODY MASS INDEX: 24.16 KG/M2 | HEART RATE: 94 BPM | RESPIRATION RATE: 20 BRPM | SYSTOLIC BLOOD PRESSURE: 116 MMHG

## 2022-09-19 DIAGNOSIS — J45.991 COUGH VARIANT ASTHMA: Chronic | ICD-10-CM

## 2022-09-19 DIAGNOSIS — Z12.11 SCREENING FOR MALIGNANT NEOPLASM OF COLON: ICD-10-CM

## 2022-09-19 DIAGNOSIS — J01.00 ACUTE NON-RECURRENT MAXILLARY SINUSITIS: Primary | ICD-10-CM

## 2022-09-19 DIAGNOSIS — J31.0 CHRONIC RHINITIS: Chronic | ICD-10-CM

## 2022-09-19 DIAGNOSIS — J20.9 ACUTE BRONCHITIS, UNSPECIFIED ORGANISM: ICD-10-CM

## 2022-09-19 DIAGNOSIS — R05.9 COUGH: ICD-10-CM

## 2022-09-19 PROCEDURE — 99213 OFFICE O/P EST LOW 20 MIN: CPT | Mod: ,,, | Performed by: NURSE PRACTITIONER

## 2022-09-19 PROCEDURE — 96372 THER/PROPH/DIAG INJ SC/IM: CPT | Mod: ,,, | Performed by: NURSE PRACTITIONER

## 2022-09-19 PROCEDURE — 99213 PR OFFICE/OUTPT VISIT, EST, LEVL III, 20-29 MIN: ICD-10-PCS | Mod: ,,, | Performed by: NURSE PRACTITIONER

## 2022-09-19 PROCEDURE — 96372 PR INJECTION,THERAP/PROPH/DIAG2ST, IM OR SUBCUT: ICD-10-PCS | Mod: ,,, | Performed by: NURSE PRACTITIONER

## 2022-09-19 RX ORDER — DEXAMETHASONE SODIUM PHOSPHATE 4 MG/ML
6 INJECTION, SOLUTION INTRA-ARTICULAR; INTRALESIONAL; INTRAMUSCULAR; INTRAVENOUS; SOFT TISSUE
Status: COMPLETED | OUTPATIENT
Start: 2022-09-19 | End: 2022-09-19

## 2022-09-19 RX ORDER — CHLOPHEDIANOL HCL AND PYRILAMINE MALEATE 12.5; 12.5 MG/5ML; MG/5ML
10 SOLUTION ORAL EVERY 8 HOURS PRN
Qty: 240 ML | Refills: 0 | Status: SHIPPED | OUTPATIENT
Start: 2022-09-19 | End: 2022-11-01

## 2022-09-19 RX ORDER — METHYLPREDNISOLONE 4 MG/1
TABLET ORAL
Qty: 21 TABLET | Refills: 0 | Status: SHIPPED | OUTPATIENT
Start: 2022-09-19 | End: 2022-11-01 | Stop reason: ALTCHOICE

## 2022-09-19 RX ORDER — AMOXICILLIN AND CLAVULANATE POTASSIUM 875; 125 MG/1; MG/1
1 TABLET, FILM COATED ORAL EVERY 12 HOURS
Qty: 20 TABLET | Refills: 0 | Status: SHIPPED | OUTPATIENT
Start: 2022-09-19 | End: 2022-09-29

## 2022-09-19 RX ORDER — CEFTRIAXONE 1 G/1
1 INJECTION, POWDER, FOR SOLUTION INTRAMUSCULAR; INTRAVENOUS
Status: COMPLETED | OUTPATIENT
Start: 2022-09-19 | End: 2022-09-19

## 2022-09-19 RX ADMIN — CEFTRIAXONE 1 G: 1 INJECTION, POWDER, FOR SOLUTION INTRAMUSCULAR; INTRAVENOUS at 12:09

## 2022-09-19 RX ADMIN — DEXAMETHASONE SODIUM PHOSPHATE 6 MG: 4 INJECTION, SOLUTION INTRA-ARTICULAR; INTRALESIONAL; INTRAMUSCULAR; INTRAVENOUS; SOFT TISSUE at 12:09

## 2022-09-19 NOTE — TELEPHONE ENCOUNTER
----- Message from SHANNAN Mane sent at 9/19/2022  1:20 PM CDT -----  Regarding: colon screening  I found her colonoscopy & it was from 2/14/2012 so > 10 yrs.  I ordered a Cologuard bc I figured she would prefer this over a colonoscopy since it is done at home w/ no prep required. Please let her know.  Thanks

## 2022-09-19 NOTE — PROGRESS NOTES
"[facility]       PATIENT NAME: Lynne Grimaldo   : 1950    AGE: 72 y.o. DATE OF ENCOUNTER: 22   MRN: [id number]      Reason for Visit / Chief Complaint: URI (Pt presents with productive cough and congestion. She had covid, about a month ago, and just hasnt gotten better. She denies fever.)     Subjective:     Presents for c/o persistent cough, sinus pressure, & congestion since having COVID 1 mth ago.  Cough is productive of thick sputum & has now lost her voice.  Used old proair rx from 2017, thinks she was coughing less but thought it might be making her hoarse so stopped using it.    Review of Systems:     Review of Systems   Constitutional:  Positive for fatigue.   HENT:  Positive for congestion, sinus pressure and voice change. Negative for sore throat.    Respiratory:  Positive for cough. Negative for shortness of breath and wheezing.    Cardiovascular: Negative.    Skin: Negative.    Neurological:  Positive for headaches.     Allergies:     Review of patient's allergies indicates:   Allergen Reactions    Sulfa (sulfonamide antibiotics) Hives and Edema      Objective:     Vitals:    22 1132   BP: 116/74   BP Location: Left arm   Patient Position: Sitting   BP Method: Large (Manual)   Pulse: 94   Resp: 20   Temp: 98.2 °F (36.8 °C)   TempSrc: Oral   SpO2: 98%   Weight: 61.9 kg (136 lb 6.4 oz)   Height: 5' 3" (1.6 m)     Body mass index is 24.16 kg/m².     Physical Exam:    Physical Exam  Vitals and nursing note reviewed.   Constitutional:       General: She is not in acute distress.     Appearance: Normal appearance.   HENT:      Head: Normocephalic.      Right Ear: Hearing, tympanic membrane, ear canal and external ear normal.      Left Ear: Hearing, tympanic membrane, ear canal and external ear normal.      Nose: Congestion present. No rhinorrhea.      Right Turbinates: Swollen.      Left Turbinates: Swollen.      Right Sinus: Maxillary sinus tenderness " present. No frontal sinus tenderness.      Left Sinus: Maxillary sinus tenderness present. No frontal sinus tenderness.      Mouth/Throat:      Lips: Pink.      Mouth: Mucous membranes are moist.      Tongue: No lesions.      Pharynx: Uvula midline. No pharyngeal swelling, oropharyngeal exudate, posterior oropharyngeal erythema (injected, PND) or uvula swelling.   Eyes:      Conjunctiva/sclera: Conjunctivae normal.      Pupils: Pupils are equal, round, and reactive to light.   Cardiovascular:      Rate and Rhythm: Normal rate and regular rhythm.      Heart sounds: Normal heart sounds.   Pulmonary:      Effort: Pulmonary effort is normal. No respiratory distress (frequent hacky cough).      Breath sounds: Normal breath sounds. No wheezing, rhonchi or rales.   Musculoskeletal:      Cervical back: No rigidity.   Lymphadenopathy:      Cervical: No cervical adenopathy.   Skin:     General: Skin is warm and dry.   Neurological:      General: No focal deficit present.      Mental Status: She is alert and oriented to person, place, and time.       Assessment:          ICD-10-CM ICD-9-CM   1. Acute non-recurrent maxillary sinusitis  J01.00 461.0   2. Chronic rhinitis  J31.0 472.0   3. Cough variant asthma  J45.991 493.82   4. Acute bronchitis, unspecified organism  J20.9 466.0   5. Cough  R05.9 786.2        Plan:     Acute non-recurrent maxillary sinusitis  -     dexamethasone injection 6 mg  -     cefTRIAXone injection 1 g  -     methylPREDNISolone (MEDROL DOSEPACK) 4 mg tablet; Take as directed on package. Take with food.  Dispense: 21 tablet; Refill: 0  -     amoxicillin-clavulanate 875-125mg (AUGMENTIN) 875-125 mg per tablet; Take 1 tablet by mouth every 12 (twelve) hours. for 10 days  Dispense: 20 tablet; Refill: 0    Chronic rhinitis    Cough variant asthma    Acute bronchitis, unspecified organism  -     dexamethasone injection 6 mg  -     cefTRIAXone injection 1 g  -     methylPREDNISolone (MEDROL DOSEPACK) 4 mg  tablet; Take as directed on package. Take with food.  Dispense: 21 tablet; Refill: 0  -     amoxicillin-clavulanate 875-125mg (AUGMENTIN) 875-125 mg per tablet; Take 1 tablet by mouth every 12 (twelve) hours. for 10 days  Dispense: 20 tablet; Refill: 0    Cough  -     pyrilamine-chlophedianoL (NINJACOF) 12.5-12.5 mg/5 mL Liqd; Take 10 mLs by mouth every 8 (eight) hours as needed (cough).  Dispense: 240 mL; Refill: 0      Current Outpatient Medications:     aspirin 81 MG Chew, Take 81 mg by mouth once daily., Disp: , Rfl:     azelastine (ASTELIN) 137 mcg (0.1 %) nasal spray, , Disp: , Rfl:     calcium citrate (CALCITRATE) 200 mg (950 mg) tablet, Take 2 tablets by mouth 2 (two) times daily. , Disp: , Rfl:     guaiFENesin-codeine 100-10 mg/5 ml (TUSSI-ORGANIDIN NR)  mg/5 mL syrup, Take 1-2 tsp by mouth q 6 hours prn cough, Disp: 120 mL, Rfl: 0    multivitamin capsule, Take 1 capsule by mouth once daily., Disp: , Rfl:     omeprazole (PRILOSEC) 20 MG capsule, Take 1 capsule (20 mg total) by mouth once daily., Disp: 30 capsule, Rfl: 11    amoxicillin-clavulanate 875-125mg (AUGMENTIN) 875-125 mg per tablet, Take 1 tablet by mouth every 12 (twelve) hours. for 10 days, Disp: 20 tablet, Rfl: 0    methylPREDNISolone (MEDROL DOSEPACK) 4 mg tablet, Take as directed on package. Take with food., Disp: 21 tablet, Rfl: 0    pyrilamine-chlophedianoL (NINJACOF) 12.5-12.5 mg/5 mL Liqd, Take 10 mLs by mouth every 8 (eight) hours as needed (cough)., Disp: 240 mL, Rfl: 0  No current facility-administered medications for this visit.    Requested Prescriptions     Signed Prescriptions Disp Refills    methylPREDNISolone (MEDROL DOSEPACK) 4 mg tablet 21 tablet 0     Sig: Take as directed on package. Take with food.    amoxicillin-clavulanate 875-125mg (AUGMENTIN) 875-125 mg per tablet 20 tablet 0     Sig: Take 1 tablet by mouth every 12 (twelve) hours. for 10 days    pyrilamine-chlophedianoL (NINJACOF) 12.5-12.5 mg/5 mL Liqd 240 mL 0      Sig: Take 10 mLs by mouth every 8 (eight) hours as needed (cough).       Discussed the diagnosis and treatment of sinusitis.  Augmentin per orders.  Treatment as above - IM meds today & start po in am.    F/u as needed or if symptoms worsen or persist.  Future Appointments   Date Time Provider Department Center   10/25/2022  1:20 PM SHANNAN Mane [facility]   11/1/2022  9:20 AM SHANNAN Mane [facility]       Signature: Kristyn Tavera [facility]

## 2022-10-15 LAB — NONINV COLON CA DNA+OCC BLD SCRN STL QL: NEGATIVE

## 2022-11-01 ENCOUNTER — OFFICE VISIT (OUTPATIENT)
Dept: FAMILY MEDICINE | Facility: CLINIC | Age: 72
End: 2022-11-01
Payer: MEDICARE

## 2022-11-01 VITALS
OXYGEN SATURATION: 97 % | DIASTOLIC BLOOD PRESSURE: 68 MMHG | BODY MASS INDEX: 22.69 KG/M2 | HEART RATE: 87 BPM | TEMPERATURE: 98 F | HEIGHT: 65 IN | WEIGHT: 136.19 LBS | SYSTOLIC BLOOD PRESSURE: 114 MMHG

## 2022-11-01 DIAGNOSIS — M25.512 ACUTE PAIN OF LEFT SHOULDER: ICD-10-CM

## 2022-11-01 DIAGNOSIS — E78.00 HYPERCHOLESTEREMIA: Primary | Chronic | ICD-10-CM

## 2022-11-01 DIAGNOSIS — Z79.899 ENCOUNTER FOR LONG-TERM (CURRENT) USE OF OTHER MEDICATIONS: ICD-10-CM

## 2022-11-01 DIAGNOSIS — K21.9 GASTROESOPHAGEAL REFLUX DISEASE WITHOUT ESOPHAGITIS: Chronic | ICD-10-CM

## 2022-11-01 DIAGNOSIS — R73.01 IFG (IMPAIRED FASTING GLUCOSE): Chronic | ICD-10-CM

## 2022-11-01 DIAGNOSIS — R73.03 PREDIABETES: ICD-10-CM

## 2022-11-01 DIAGNOSIS — J31.0 CHRONIC RHINITIS: Chronic | ICD-10-CM

## 2022-11-01 DIAGNOSIS — N18.32 STAGE 3B CHRONIC KIDNEY DISEASE: Chronic | ICD-10-CM

## 2022-11-01 DIAGNOSIS — Z11.59 NEED FOR HEPATITIS C SCREENING TEST: ICD-10-CM

## 2022-11-01 PROBLEM — E78.2 MIXED HYPERLIPIDEMIA: Chronic | Status: ACTIVE | Noted: 2021-04-05

## 2022-11-01 PROBLEM — E78.2 MIXED HYPERLIPIDEMIA: Status: ACTIVE | Noted: 2021-04-05

## 2022-11-01 PROCEDURE — 99214 OFFICE O/P EST MOD 30 MIN: CPT | Mod: ,,, | Performed by: NURSE PRACTITIONER

## 2022-11-01 PROCEDURE — 99214 PR OFFICE/OUTPT VISIT, EST, LEVL IV, 30-39 MIN: ICD-10-PCS | Mod: ,,, | Performed by: NURSE PRACTITIONER

## 2022-11-01 RX ORDER — POLYETHYLENE GLYCOL 3350 17 G/17G
17 POWDER, FOR SOLUTION ORAL EVERY OTHER DAY
COMMUNITY

## 2022-11-01 RX ORDER — OMEPRAZOLE 40 MG/1
40 CAPSULE, DELAYED RELEASE ORAL DAILY
COMMUNITY
End: 2023-03-21 | Stop reason: SDUPTHER

## 2022-11-01 NOTE — PROGRESS NOTES
[facility] - FAMILY MEDICINE       PATIENT NAME: Lynne Grimaldo   : 1950    AGE: 72 y.o. DATE OF ENCOUNTER: 22    MRN: [id number]      PCP: SHANNAN Mane    Reason for Visit / Chief Complaint:  Follow-up (Follow-up labs and check-up; left shoulder pain x 2 weeks, getting a little better)     Subjective:     HPI:    Presents for follow-up - hasn't had labs in a while w/ hx HLD, CKD, & predm.  Chose not to take meds.    Hx of expressive aphasia episodes.  Saw Neuro in past, normal aging changes on MRI/MRA Dec 2020/2021.    Still has fullness & epigastric discomfort not as bad since taking MiraLax every other day and increased to omeprazole 40 mg daily.    Left shoulder pain x 2 weeks, hurts to reach back & to pull pants up.  Used a topical pain patch which has helped & pain is some better.   x approx 50 yrs    Review of Systems:     Review of Systems   Constitutional: Negative.    HENT: Negative.          Chronic rhinitis   Eyes: Negative.    Respiratory: Negative.     Cardiovascular: Negative.    Gastrointestinal:  Positive for abdominal distention and abdominal pain. Negative for blood in stool, nausea and vomiting.   Genitourinary: Negative.    Musculoskeletal:  Positive for arthralgias.   Skin: Negative.    Neurological: Negative.    Hematological: Negative.    Psychiatric/Behavioral: Negative.       Allergies:     Review of patient's allergies indicates:   Allergen Reactions    Sulfa (sulfonamide antibiotics) Hives and Edema        Labs:      CMP:  Sodium   Date Value Ref Range Status   10/12/2021 140 136 - 145 mmol/L Final     Potassium   Date Value Ref Range Status   10/12/2021 3.8 3.5 - 5.1 mmol/L Final     Chloride   Date Value Ref Range Status   10/12/2021 107 98 - 107 mmol/L Final     Glucose   Date Value Ref Range Status   10/12/2021 131 (H) 74 - 106 mg/dL Final     BUN   Date Value Ref Range Status   10/12/2021 26 (H) 7 - 18 mg/dL Final  "    Creatinine   Date Value Ref Range Status   10/12/2021 1.49 (H) 0.55 - 1.02 mg/dL Final     AST   Date Value Ref Range Status   10/12/2021 26 15 - 37 U/L Final     ALT   Date Value Ref Range Status   10/12/2021 28 13 - 56 U/L Final     eGFR   Date Value Ref Range Status   10/12/2021 37 (L) >=60 mL/min/1.73m² Final      CBC:  Lab Results   Component Value Date    WBC 6.23 10/12/2021    RBC 4.75 10/12/2021    HGB 14.0 10/12/2021    HCT 42.7 10/12/2021     10/12/2021          Medical History:     Past Medical History:   Diagnosis Date    Back pain     Chronic kidney disease, stage 3b 08/01/2018    GFR 44 at 1st visit 8/1/18    Cough variant asthma     COVID-19 07/2021    GERD (gastroesophageal reflux disease)     Hypercholesteremia 09/11/2018    Prediabetes 08/07/2018 8/7/18 A1c 6.5%, didn't take med; Nov 2018 6.1%; Dec 2020 6.0%    Stroke       Social History     Tobacco Use   Smoking Status Never   Smokeless Tobacco Never      Past Surgical History:   Procedure Laterality Date    APPENDECTOMY      BACK SURGERY      ruptured disc        Objective:      Vitals:    11/01/22 1013   BP: 114/68   BP Location: Left arm   Patient Position: Sitting   BP Method: Medium (Manual)   Pulse: 87   Temp: 98.2 °F (36.8 °C)   TempSrc: Oral   SpO2: 97%   Weight: 61.8 kg (136 lb 3.2 oz)   Height: 5' 5" (1.651 m)     Body mass index is 22.66 kg/m².     Physical Exam:    Physical Exam  Vitals and nursing note reviewed.   Constitutional:       General: She is not in acute distress.     Appearance: Normal appearance.   HENT:      Head: Normocephalic.      Right Ear: Tympanic membrane, ear canal and external ear normal.      Left Ear: Tympanic membrane, ear canal and external ear normal.      Nose: Nose normal.      Mouth/Throat:      Mouth: Mucous membranes are moist.      Pharynx: Oropharynx is clear.   Eyes:      Conjunctiva/sclera: Conjunctivae normal.      Pupils: Pupils are equal, round, and reactive to light.   Neck:      " Thyroid: No thyroid mass or thyromegaly.      Vascular: Normal carotid pulses. No carotid bruit.   Cardiovascular:      Rate and Rhythm: Normal rate and regular rhythm.      Pulses: Normal pulses.      Heart sounds: Normal heart sounds.   Pulmonary:      Effort: Pulmonary effort is normal.      Breath sounds: Normal breath sounds.   Abdominal:      Palpations: Abdomen is soft.      Tenderness: There is no abdominal tenderness.   Musculoskeletal:      Cervical back: Neck supple.      Right lower leg: No edema.      Left lower leg: No edema.   Lymphadenopathy:      Cervical: No cervical adenopathy.   Skin:     General: Skin is warm and dry.   Neurological:      General: No focal deficit present.      Mental Status: She is alert and oriented to person, place, and time.   Psychiatric:         Mood and Affect: Mood normal.         Behavior: Behavior normal.        Assessment:          ICD-10-CM ICD-9-CM   1. Hypercholesteremia  E78.00 272.0   2. Encounter for long-term (current) use of other medications  Z79.899 V58.69   3. IFG (impaired fasting glucose)  R73.01 790.21   4. Gastroesophageal reflux disease without esophagitis  K21.9 530.81   5. Chronic rhinitis  J31.0 472.0   6. Need for hepatitis C screening test  Z11.59 V73.89   7. Stage 3b chronic kidney disease  N18.32 585.3   8. Acute pain of left shoulder  M25.512 719.41   9. Prediabetes  R73.03 790.29        Plan:       Hypercholesteremia  Comments:  chose not to take statin  Orders:  -     Lipid Panel; Future; Expected date: 11/08/2022    Encounter for long-term (current) use of other medications  -     CBC Auto Differential; Future; Expected date: 11/08/2022  -     Comprehensive Metabolic Panel; Future; Expected date: 11/08/2022  -     TSH; Future; Expected date: 11/08/2022  -     Microalbumin/Creatinine Ratio, Urine; Future; Expected date: 11/08/2022    IFG (impaired fasting glucose)  -     Microalbumin/Creatinine Ratio, Urine; Future; Expected date:  11/08/2022  -     Hemoglobin A1C; Future; Expected date: 11/08/2022    Gastroesophageal reflux disease without esophagitis    Chronic rhinitis    Need for hepatitis C screening test  -     Cancel: Hepatitis C Antibody; Future; Expected date: 11/08/2022    Stage 3b chronic kidney disease  -     Microalbumin/Creatinine Ratio, Urine; Future; Expected date: 11/08/2022    Acute pain of left shoulder    Prediabetes      Current Outpatient Medications:     aspirin 81 MG Chew, Take 81 mg by mouth once daily., Disp: , Rfl:     azelastine (ASTELIN) 137 mcg (0.1 %) nasal spray, 2 sprays 2 (two) times daily., Disp: , Rfl:     calcium citrate (CALCITRATE) 200 mg (950 mg) tablet, Take 1 tablet by mouth once daily., Disp: , Rfl:     multivitamin capsule, Take 1 capsule by mouth once daily., Disp: , Rfl:     omeprazole (PRILOSEC) 40 MG capsule, Take 40 mg by mouth Daily., Disp: , Rfl:     polyethylene glycol (GLYCOLAX) 17 gram/dose powder, Take 17 g by mouth every other day., Disp: , Rfl:     New & refilled meds:  Requested Prescriptions      No prescriptions requested or ordered in this encounter     No  today, will RTC fasting for labs next Tues 11/8/22.  If xray coverage here 11/8/22, will get xray L shoulder and if not, [facility] if [facility] persists.    Declines vaccinations.    Return to clinic fasting labs next wk; AWV 11/17/22 8:00am; and f/u as needed.    Future Appointments   Date Time Provider Department Center   11/17/2022  8:00 AM KONSTANTIN NURSE, [facility] FAMILY MEDICINE [facility] SHERRON Fenton        Signature:  SHANNAN Mane

## 2022-11-09 DIAGNOSIS — Z71.89 COMPLEX CARE COORDINATION: ICD-10-CM

## 2022-12-23 ENCOUNTER — OFFICE VISIT (OUTPATIENT)
Dept: FAMILY MEDICINE | Facility: CLINIC | Age: 72
End: 2022-12-23
Payer: MEDICARE

## 2022-12-23 VITALS
TEMPERATURE: 98 F | OXYGEN SATURATION: 99 % | RESPIRATION RATE: 18 BRPM | DIASTOLIC BLOOD PRESSURE: 73 MMHG | HEART RATE: 79 BPM | HEIGHT: 65 IN | SYSTOLIC BLOOD PRESSURE: 153 MMHG | WEIGHT: 139 LBS | BODY MASS INDEX: 23.16 KG/M2

## 2022-12-23 DIAGNOSIS — J32.9 SINUSITIS, UNSPECIFIED CHRONICITY, UNSPECIFIED LOCATION: Primary | ICD-10-CM

## 2022-12-23 PROCEDURE — 96372 THER/PROPH/DIAG INJ SC/IM: CPT | Mod: ,,, | Performed by: FAMILY MEDICINE

## 2022-12-23 PROCEDURE — 96372 PR INJECTION,THERAP/PROPH/DIAG2ST, IM OR SUBCUT: ICD-10-PCS | Mod: ,,, | Performed by: FAMILY MEDICINE

## 2022-12-23 PROCEDURE — 99213 OFFICE O/P EST LOW 20 MIN: CPT | Mod: ,,, | Performed by: FAMILY MEDICINE

## 2022-12-23 PROCEDURE — 99213 PR OFFICE/OUTPT VISIT, EST, LEVL III, 20-29 MIN: ICD-10-PCS | Mod: ,,, | Performed by: FAMILY MEDICINE

## 2022-12-23 RX ORDER — AMOXICILLIN AND CLAVULANATE POTASSIUM 875; 125 MG/1; MG/1
1 TABLET, FILM COATED ORAL 2 TIMES DAILY
Qty: 14 TABLET | Refills: 0 | Status: SHIPPED | OUTPATIENT
Start: 2022-12-23 | End: 2022-12-30

## 2022-12-23 RX ORDER — PREDNISONE 20 MG/1
20 TABLET ORAL DAILY
Qty: 5 TABLET | Refills: 0 | Status: SHIPPED | OUTPATIENT
Start: 2022-12-23 | End: 2022-12-28

## 2022-12-23 RX ORDER — CEFTRIAXONE 500 MG/1
500 INJECTION, POWDER, FOR SOLUTION INTRAMUSCULAR; INTRAVENOUS
Status: COMPLETED | OUTPATIENT
Start: 2022-12-23 | End: 2022-12-23

## 2022-12-23 RX ORDER — BENZONATATE 100 MG/1
100 CAPSULE ORAL 3 TIMES DAILY PRN
Qty: 20 CAPSULE | Refills: 0 | Status: SHIPPED | OUTPATIENT
Start: 2022-12-23 | End: 2023-03-27

## 2022-12-23 RX ORDER — DEXAMETHASONE SODIUM PHOSPHATE 4 MG/ML
4 INJECTION, SOLUTION INTRA-ARTICULAR; INTRALESIONAL; INTRAMUSCULAR; INTRAVENOUS; SOFT TISSUE
Status: COMPLETED | OUTPATIENT
Start: 2022-12-23 | End: 2022-12-23

## 2022-12-23 RX ADMIN — DEXAMETHASONE SODIUM PHOSPHATE 4 MG: 4 INJECTION, SOLUTION INTRA-ARTICULAR; INTRALESIONAL; INTRAMUSCULAR; INTRAVENOUS; SOFT TISSUE at 07:12

## 2022-12-23 RX ADMIN — CEFTRIAXONE 500 MG: 500 INJECTION, POWDER, FOR SOLUTION INTRAMUSCULAR; INTRAVENOUS at 07:12

## 2022-12-24 NOTE — PROGRESS NOTES
Subjective:       Patient ID: Lynne Grimaldo is a 72 y.o. female.    Chief Complaint: Sinus Problem (Pt states that it is sinus. Her eyes been watering with loss of voice and nasal drainage x 3days)    HPI  Review of Systems   Constitutional:  Negative for activity change, appetite change, chills, diaphoresis, fatigue, fever and unexpected weight change.   HENT:  Positive for nasal congestion, postnasal drip, rhinorrhea and sinus pressure/congestion. Negative for dental problem, drooling, ear discharge, ear pain, facial swelling, hearing loss, mouth sores, nosebleeds, sneezing, sore throat, tinnitus, trouble swallowing, voice change and goiter.    Eyes:  Negative for photophobia, discharge, itching and visual disturbance.   Respiratory:  Positive for cough. Negative for apnea, choking, chest tightness, shortness of breath, wheezing and stridor.    Cardiovascular:  Negative for chest pain, palpitations, leg swelling and claudication.   Gastrointestinal:  Negative for abdominal distention, abdominal pain, anal bleeding, blood in stool, change in bowel habit, constipation, diarrhea, nausea, vomiting and change in bowel habit.   Endocrine: Negative for cold intolerance, heat intolerance, polydipsia, polyphagia and polyuria.   Genitourinary:  Negative for bladder incontinence, decreased urine volume, difficulty urinating, dysuria, enuresis, flank pain, frequency, hematuria, nocturia, pelvic pain and urgency.   Musculoskeletal:  Negative for arthralgias, back pain, gait problem, joint swelling, leg pain, myalgias, neck pain, neck stiffness and joint deformity.   Integumentary:  Negative for pallor, rash, wound, breast mass and breast tenderness.   Allergic/Immunologic: Negative for environmental allergies, food allergies and immunocompromised state.   Neurological:  Negative for dizziness, vertigo, tremors, seizures, syncope, facial asymmetry, speech difficulty, weakness, light-headedness, numbness, headaches,  coordination difficulties, memory loss and coordination difficulties.   Hematological:  Negative for adenopathy. Does not bruise/bleed easily.   Psychiatric/Behavioral:  Negative for agitation, behavioral problems, confusion, decreased concentration, dysphoric mood, hallucinations, self-injury, sleep disturbance and suicidal ideas. The patient is not nervous/anxious and is not hyperactive.    Breast: Negative for mass and tenderness      Objective:      Physical Exam  Vitals reviewed.   Constitutional:       Appearance: Normal appearance.   HENT:      Head: Normocephalic and atraumatic.      Right Ear: Tympanic membrane, ear canal and external ear normal.      Left Ear: Tympanic membrane, ear canal and external ear normal.      Nose: Congestion and rhinorrhea present.      Mouth/Throat:      Mouth: Mucous membranes are moist.      Pharynx: Oropharynx is clear. Posterior oropharyngeal erythema present.   Eyes:      Extraocular Movements: Extraocular movements intact.      Conjunctiva/sclera: Conjunctivae normal.      Pupils: Pupils are equal, round, and reactive to light.   Cardiovascular:      Rate and Rhythm: Normal rate and regular rhythm.      Pulses: Normal pulses.      Heart sounds: Normal heart sounds.   Pulmonary:      Effort: Pulmonary effort is normal.      Breath sounds: Normal breath sounds.   Abdominal:      General: Bowel sounds are normal.      Palpations: Abdomen is soft.   Musculoskeletal:         General: Normal range of motion.      Cervical back: Normal range of motion and neck supple.   Skin:     General: Skin is warm and dry.   Neurological:      General: No focal deficit present.      Mental Status: She is alert. Mental status is at baseline.   Psychiatric:         Mood and Affect: Mood normal.         Behavior: Behavior normal.         Thought Content: Thought content normal.         Judgment: Judgment normal.       Assessment:       1. Sinusitis, unspecified chronicity, unspecified location           Plan:     Sinusitis, unspecified chronicity, unspecified location  -     cefTRIAXone injection 500 mg  -     dexAMETHasone injection 4 mg    Other orders  -     amoxicillin-clavulanate 875-125mg (AUGMENTIN) 875-125 mg per tablet; Take 1 tablet by mouth 2 (two) times a day. for 7 days  Dispense: 14 tablet; Refill: 0  -     predniSONE (DELTASONE) 20 MG tablet; Take 1 tablet (20 mg total) by mouth once daily. for 5 days  Dispense: 5 tablet; Refill: 0  -     benzonatate (TESSALON) 100 MG capsule; Take 1 capsule (100 mg total) by mouth 3 (three) times daily as needed for Cough.  Dispense: 20 capsule; Refill: 0

## 2023-03-21 ENCOUNTER — OFFICE VISIT (OUTPATIENT)
Dept: GASTROENTEROLOGY | Facility: CLINIC | Age: 73
End: 2023-03-21
Payer: MEDICARE

## 2023-03-21 VITALS
DIASTOLIC BLOOD PRESSURE: 56 MMHG | HEIGHT: 65 IN | OXYGEN SATURATION: 95 % | BODY MASS INDEX: 23.76 KG/M2 | WEIGHT: 142.63 LBS | SYSTOLIC BLOOD PRESSURE: 130 MMHG | HEART RATE: 78 BPM

## 2023-03-21 DIAGNOSIS — R19.06 EPIGASTRIC FULLNESS: ICD-10-CM

## 2023-03-21 DIAGNOSIS — R68.81 EARLY SATIETY: Primary | ICD-10-CM

## 2023-03-21 DIAGNOSIS — K21.9 GASTROESOPHAGEAL REFLUX DISEASE, UNSPECIFIED WHETHER ESOPHAGITIS PRESENT: ICD-10-CM

## 2023-03-21 PROCEDURE — 99214 PR OFFICE/OUTPT VISIT, EST, LEVL IV, 30-39 MIN: ICD-10-PCS | Mod: S$PBB,,, | Performed by: NURSE PRACTITIONER

## 2023-03-21 PROCEDURE — 99214 OFFICE O/P EST MOD 30 MIN: CPT | Mod: S$PBB,,, | Performed by: NURSE PRACTITIONER

## 2023-03-21 PROCEDURE — 99214 OFFICE O/P EST MOD 30 MIN: CPT | Mod: PBBFAC | Performed by: NURSE PRACTITIONER

## 2023-03-21 RX ORDER — OMEPRAZOLE 40 MG/1
40 CAPSULE, DELAYED RELEASE ORAL EVERY MORNING
Qty: 30 CAPSULE | Refills: 5 | Status: SHIPPED | OUTPATIENT
Start: 2023-03-21 | End: 2023-11-07 | Stop reason: SDUPTHER

## 2023-03-21 NOTE — PROGRESS NOTES
Lynne Grimaldo is a 72 y.o. female here for Medication Refill        PCP: Kristyn Tavera  Referring Provider: No referring provider defined for this encounter.     HPI:  Presents for follow-up today due to GERD.  The patient continues to report early satiety and epigastric fullness.  She does not have any nausea or vomiting.  She did have an ultrasound December of 2021 that was within normal limits. Hida scan 1/17/22, was also within normal limits.  She reports frequent belching.  States that she is not able to tolerate a normal-sized meal without stomach upset.  She is currently taking omeprazole 40 mg daily.  She states that she is unable to do without the medication due to increased reflux symptoms.  Her last EGD was 11/15/2021, no ulcers were noted., no hiatal hernia reported.  Duodenum was normal.  She denies dysphagia.  She has had COVID x3 according to the patient.  Discussed the possibility of delayed gastric emptying and further testing to evaluate.  She agrees to schedule a gastric emptying study.  Also discussed a low residue diet as a trial to see if this does improve symptoms.  She does state that she salads regularly.    Medication Refill  Pertinent negatives include no abdominal pain, change in bowel habit, chest pain, fatigue, fever, nausea or vomiting.       ROS:  Review of Systems   Constitutional:  Positive for appetite change (early satiety). Negative for fatigue, fever and unexpected weight change.   HENT:  Negative for trouble swallowing.    Respiratory:  Negative for shortness of breath.    Cardiovascular:  Negative for chest pain.   Gastrointestinal:  Positive for constipation and reflux. Negative for abdominal pain, blood in stool, change in bowel habit, diarrhea, nausea, vomiting and change in bowel habit.   Musculoskeletal:  Negative for gait problem.   Integumentary:  Negative for pallor.   Neurological:  Negative for light-headedness.   Psychiatric/Behavioral:  The patient  "is not nervous/anxious.         PMHX:  has a past medical history of Back pain, Chronic kidney disease, stage 3b (08/01/2018), Cough variant asthma, COVID-19 (07/2021), GERD (gastroesophageal reflux disease), Hypercholesteremia (09/11/2018), Prediabetes (08/07/2018), and Stroke.    PSHX:  has a past surgical history that includes Appendectomy and Back surgery.    PFHX: family history includes Arthritis in her maternal grandfather; Cancer in her father; Diabetes in her brother; No Known Problems in her daughter, maternal grandmother, paternal grandfather, paternal grandmother, sister, son, and son; Spine Surgery in her mother.    PSlHX:  reports that she has never smoked. She has never used smokeless tobacco. She reports that she does not currently use alcohol. She reports that she does not use drugs.        Review of patient's allergies indicates:   Allergen Reactions    Sulfa (sulfonamide antibiotics) Hives and Edema       Medication List with Changes/Refills   Current Medications    ASPIRIN 81 MG CHEW    Take 81 mg by mouth once daily.    AZELASTINE (ASTELIN) 137 MCG (0.1 %) NASAL SPRAY    2 sprays 2 (two) times daily.    BENZONATATE (TESSALON) 100 MG CAPSULE    Take 1 capsule (100 mg total) by mouth 3 (three) times daily as needed for Cough.    CALCIUM CITRATE (CALCITRATE) 200 MG (950 MG) TABLET    Take 1 tablet by mouth once daily.    MULTIVITAMIN CAPSULE    Take 1 capsule by mouth once daily.    OMEPRAZOLE (PRILOSEC) 40 MG CAPSULE    Take 40 mg by mouth Daily.    POLYETHYLENE GLYCOL (GLYCOLAX) 17 GRAM/DOSE POWDER    Take 17 g by mouth every other day.        Objective Findings:  Vital Signs:  BP (!) 130/56   Pulse 78   Ht 5' 5" (1.651 m)   Wt 64.7 kg (142 lb 9.6 oz)   SpO2 95%   BMI 23.73 kg/m²  Body mass index is 23.73 kg/m².    Physical Exam:  Physical Exam  Vitals and nursing note reviewed.   Constitutional:       General: She is not in acute distress.     Appearance: Normal appearance.   HENT:      " Mouth/Throat:      Mouth: Mucous membranes are moist.   Cardiovascular:      Rate and Rhythm: Normal rate.   Pulmonary:      Effort: Pulmonary effort is normal.      Breath sounds: No wheezing, rhonchi or rales.   Abdominal:      General: Bowel sounds are normal. There is no distension.      Palpations: Abdomen is soft. There is no mass.      Tenderness: There is no abdominal tenderness.   Skin:     General: Skin is warm and dry.      Coloration: Skin is not jaundiced or pale.   Neurological:      Mental Status: She is alert and oriented to person, place, and time.   Psychiatric:         Mood and Affect: Mood normal.        Labs:  Lab Results   Component Value Date    WBC 5.01 11/08/2022    HGB 14.5 11/08/2022    HCT 44.3 11/08/2022    MCV 89.3 11/08/2022    RDW 13.2 11/08/2022     11/08/2022    LYMPH 29.9 11/08/2022    LYMPH 1.50 11/08/2022    MONO 8.8 (H) 11/08/2022    EOS 0.34 11/08/2022    BASO 0.09 11/08/2022     Lab Results   Component Value Date     11/08/2022    K 4.0 11/08/2022     11/08/2022    CO2 24 11/08/2022     (H) 11/08/2022    BUN 23 (H) 11/08/2022    CREATININE 1.25 (H) 11/08/2022    CALCIUM 10.0 11/08/2022    PROT 7.6 11/08/2022    ALBUMIN 4.1 11/08/2022    BILITOT 0.5 11/08/2022    ALKPHOS 108 11/08/2022    AST 25 11/08/2022    ALT 30 11/08/2022         Imaging: No results found.      Assessment:  Lynne Grimaldo is a 72 y.o. female here with:  1. Early satiety    2. Epigastric fullness          Recommendations:  1. Gastric emptying  2. Do not lay down within 3 hours of eating.  Avoid spicy, greasy foods  Eat 6-8 small meals per day.  Exercise 150 minutes per week  Avoid raw fruits and vegetables  Small amount of protein and fiber at one time      Follow up in about 4 weeks (around 4/18/2023).      Order summary:  Orders Placed This Encounter    NM Gastric Emptying       Thank you for allowing me to participate in the care of Lynne Grimaldo.      Elle Matta,  FNP-C

## 2023-03-27 ENCOUNTER — OFFICE VISIT (OUTPATIENT)
Dept: FAMILY MEDICINE | Facility: CLINIC | Age: 73
End: 2023-03-27
Payer: MEDICARE

## 2023-03-27 VITALS
DIASTOLIC BLOOD PRESSURE: 72 MMHG | SYSTOLIC BLOOD PRESSURE: 138 MMHG | HEART RATE: 83 BPM | TEMPERATURE: 98 F | HEIGHT: 65 IN | BODY MASS INDEX: 22.33 KG/M2 | OXYGEN SATURATION: 98 % | WEIGHT: 134 LBS | RESPIRATION RATE: 20 BRPM

## 2023-03-27 DIAGNOSIS — H65.91 OTITIS MEDIA WITH EFFUSION, RIGHT: Primary | ICD-10-CM

## 2023-03-27 DIAGNOSIS — J31.0 CHRONIC RHINITIS: Chronic | ICD-10-CM

## 2023-03-27 PROBLEM — E78.00 HYPERCHOLESTEREMIA: Chronic | Status: ACTIVE | Noted: 2022-11-01

## 2023-03-27 PROBLEM — F80.1 EXPRESSIVE LANGUAGE DISORDER: Chronic | Status: RESOLVED | Noted: 2021-04-05 | Resolved: 2023-03-27

## 2023-03-27 PROBLEM — R73.03 PREDIABETES: Chronic | Status: ACTIVE | Noted: 2018-08-07

## 2023-03-27 PROBLEM — G45.9 TIA (TRANSIENT ISCHEMIC ATTACK): Chronic | Status: RESOLVED | Noted: 2021-04-05 | Resolved: 2023-03-27

## 2023-03-27 PROBLEM — N18.32 CHRONIC KIDNEY DISEASE, STAGE 3B: Chronic | Status: ACTIVE | Noted: 2018-08-01

## 2023-03-27 PROCEDURE — 99212 OFFICE O/P EST SF 10 MIN: CPT | Mod: ,,, | Performed by: NURSE PRACTITIONER

## 2023-03-27 PROCEDURE — 99212 PR OFFICE/OUTPT VISIT, EST, LEVL II, 10-19 MIN: ICD-10-PCS | Mod: ,,, | Performed by: NURSE PRACTITIONER

## 2023-03-27 NOTE — PROGRESS NOTES
[facility] - FAMILY MEDICINE       PATIENT NAME: Lynne Grimaldo   : 1950    AGE: 72 y.o. DATE OF ENCOUNTER: 3/27/23   MRN: [id number]      PCP:  SHANNAN Mane    Reason for Visit / Chief Complaint: Otalgia (Patient presents to the clinic ear pain)     Subjective:     Presents c/o R ear discomfort like it has fluid in it intermittently x 2 wks.  Feels like her head is in a drum.  Using flonase and astelin nasal spray.    Had EGD & gallbladder work-up; early satiety, can't eat much, will have a gastric emptying study.    Review of Systems:     Review of Systems   Constitutional:  Positive for fatigue. Negative for chills and fever.   HENT:  Positive for congestion, ear pain, sinus pressure and voice change. Negative for postnasal drip, sneezing and sore throat.    Respiratory:  Positive for cough. Negative for shortness of breath and wheezing.    Cardiovascular: Negative.    Neurological:  Positive for headaches.     Allergies and Meds:     Review of patient's allergies indicates:   Allergen Reactions    Sulfa (sulfonamide antibiotics) Hives and Edema        Current Outpatient Medications:     aspirin 81 MG Chew, Take 81 mg by mouth once daily., Disp: , Rfl:     azelastine (ASTELIN) 137 mcg (0.1 %) nasal spray, 2 sprays 2 (two) times daily., Disp: , Rfl:     calcium citrate (CALCITRATE) 200 mg (950 mg) tablet, Take 1 tablet by mouth once daily., Disp: , Rfl:     multivitamin capsule, Take 1 capsule by mouth once daily., Disp: , Rfl:     omeprazole (PRILOSEC) 40 MG capsule, Take 1 capsule (40 mg total) by mouth every morning., Disp: 30 capsule, Rfl: 5    polyethylene glycol (GLYCOLAX) 17 gram/dose powder, Take 17 g by mouth every other day., Disp: , Rfl:     Medical History:     Past Medical History:   Diagnosis Date    Back pain     Chronic kidney disease, stage 3b 2018    GFR 44 at 1st visit 18    Cough variant asthma     COVID-19 2021    Expressive language disorder  "4/5/2021    GERD (gastroesophageal reflux disease)     Hypercholesteremia 09/11/2018    Prediabetes 08/07/2018 8/7/18 A1c 6.5%, didn't take med; Nov 2018 6.1%; Dec 2020 6.0%    Stroke     TIA (transient ischemic attack) 4/5/2021      Social History     Tobacco Use   Smoking Status Never   Smokeless Tobacco Never     Objective:     Wt Readings from Last 3 Encounters:   03/27/23 1311 60.8 kg (134 lb)   03/21/23 1507 64.7 kg (142 lb 9.6 oz)   12/23/22 1845 63 kg (139 lb)       /72 (BP Location: Right arm, Patient Position: Sitting, BP Method: Small (Manual))   Pulse 83   Temp 98.4 °F (36.9 °C) (Oral)   Resp 20   Ht 5' 5" (1.651 m)   Wt 60.8 kg (134 lb)   SpO2 98%   BMI 22.30 kg/m²   Body mass index is 22.3 kg/m².     Physical Exam:    Physical Exam  Vitals and nursing note reviewed.   Constitutional:       General: She is not in acute distress.     Appearance: Normal appearance.   HENT:      Head: Normocephalic.      Right Ear: Tympanic membrane and external ear normal. Impacted cerumen: small piece of cerumen obscuring lower portion of TM flushed out with warm water irrigation per nurse; small clear effusion noted, no TM redness, no bulging.      Left Ear: Tympanic membrane, ear canal and external ear normal.      Nose: Mucosal edema present. No rhinorrhea.      Right Sinus: No maxillary sinus tenderness or frontal sinus tenderness.      Left Sinus: No maxillary sinus tenderness or frontal sinus tenderness.      Mouth/Throat:      Mouth: Mucous membranes are moist.      Pharynx: Oropharynx is clear.   Eyes:      Conjunctiva/sclera: Conjunctivae normal.      Pupils: Pupils are equal, round, and reactive to light.   Neck:      Thyroid: No thyroid mass or thyromegaly.      Vascular: Normal carotid pulses. No carotid bruit.   Cardiovascular:      Rate and Rhythm: Normal rate and regular rhythm.      Pulses: Normal pulses.      Heart sounds: Normal heart sounds.   Pulmonary:      Effort: Pulmonary effort is " normal.      Breath sounds: Normal breath sounds.   Abdominal:      Palpations: Abdomen is soft.      Tenderness: There is no abdominal tenderness.   Musculoskeletal:      Cervical back: Neck supple.      Right lower leg: No edema.      Left lower leg: No edema.   Lymphadenopathy:      Cervical: No cervical adenopathy.   Skin:     General: Skin is warm and dry.   Neurological:      General: No focal deficit present.      Mental Status: She is alert and oriented to person, place, and time.   Psychiatric:         Mood and Affect: Mood normal.         Behavior: Behavior normal.       Assessment and Plan:        1. Otitis media with effusion, right    2. Chronic rhinitis    No indication for antibiotic at this time.  Continue use of Flonase and can use 2 times per day if needed to help alleviate effusion.    F/u as needed or if symptoms worsen or persist.  Future Appointments   Date Time Provider Department Center   4/17/2023  8:30 AM [address]   5/2/2023  2:00 PM SHANNAN [facility]       Signature: Kristyn CAMPBELL-BC

## 2023-04-17 ENCOUNTER — HOSPITAL ENCOUNTER (OUTPATIENT)
Dept: RADIOLOGY | Facility: HOSPITAL | Age: 73
Discharge: HOME | End: 2023-04-17
Attending: NURSE PRACTITIONER
Payer: MEDICARE

## 2023-04-17 DIAGNOSIS — R19.06 EPIGASTRIC FULLNESS: ICD-10-CM

## 2023-04-17 DIAGNOSIS — R68.81 EARLY SATIETY: ICD-10-CM

## 2023-04-17 PROCEDURE — 78264 GASTRIC EMPTYING IMG STUDY: CPT | Mod: 26,,, | Performed by: RADIOLOGY

## 2023-04-17 PROCEDURE — 78264 NM GASTRIC EMPTYING: ICD-10-PCS | Mod: 26,,, | Performed by: RADIOLOGY

## 2023-04-17 PROCEDURE — 78264 GASTRIC EMPTYING IMG STUDY: CPT | Mod: TC

## 2023-05-02 ENCOUNTER — OFFICE VISIT (OUTPATIENT)
Dept: GASTROENTEROLOGY | Facility: CLINIC | Age: 73
End: 2023-05-02
Payer: MEDICARE

## 2023-05-02 VITALS
HEIGHT: 65 IN | HEART RATE: 84 BPM | DIASTOLIC BLOOD PRESSURE: 63 MMHG | SYSTOLIC BLOOD PRESSURE: 129 MMHG | WEIGHT: 140.81 LBS | BODY MASS INDEX: 23.46 KG/M2 | OXYGEN SATURATION: 97 %

## 2023-05-02 DIAGNOSIS — R19.06 EPIGASTRIC FULLNESS: ICD-10-CM

## 2023-05-02 DIAGNOSIS — R68.81 EARLY SATIETY: ICD-10-CM

## 2023-05-02 DIAGNOSIS — K31.84 GASTROPARESIS: Primary | ICD-10-CM

## 2023-05-02 PROCEDURE — 99214 PR OFFICE/OUTPT VISIT, EST, LEVL IV, 30-39 MIN: ICD-10-PCS | Mod: S$PBB,,, | Performed by: NURSE PRACTITIONER

## 2023-05-02 PROCEDURE — 99214 OFFICE O/P EST MOD 30 MIN: CPT | Mod: S$PBB,,, | Performed by: NURSE PRACTITIONER

## 2023-05-02 PROCEDURE — 99213 OFFICE O/P EST LOW 20 MIN: CPT | Mod: PBBFAC | Performed by: NURSE PRACTITIONER

## 2023-05-02 NOTE — PROGRESS NOTES
Lynne Grimaldo is a 72 y.o. female here for No chief complaint on file.        PCP: Kristyn Tavera  Referring Provider: No referring provider defined for this encounter.     HPI:  Presents for follow-up after gastric emptying study.  Gastric emptying study has been reviewed and was delayed.  We did have a discussion regarding low residue and small frequent meal diet.  Advised to avoid raw fruits and vegetables.  Avoid lying down within 3 hours of eating.  She did also have an EGD dilation 11/15/2021, report reviewed, mild erythema without ulcers, intestinal metaplasia.  Recommendation to repeat in 3 years.  Patient will be due for EGD next year.  She did have a HIDA scan on 01/17/2022, that is negative. Abdominal ultrasound on 01/17/2022, that is negative. Patient had a Cologuard approximately one year ago.        ROS:  Review of Systems   Constitutional:  Positive for appetite change (early satiety). Negative for fatigue, fever and unexpected weight change.   HENT:  Negative for trouble swallowing.    Respiratory:  Negative for shortness of breath.    Cardiovascular:  Negative for chest pain.   Gastrointestinal:  Positive for reflux. Negative for abdominal pain (belching), blood in stool, change in bowel habit, constipation, diarrhea, nausea, vomiting and change in bowel habit.   Musculoskeletal:  Negative for gait problem.   Integumentary:  Negative for pallor.   Neurological:  Negative for light-headedness.   Psychiatric/Behavioral:  The patient is not nervous/anxious.         PMHX:  has a past medical history of Back pain, Chronic kidney disease, stage 3b (08/01/2018), Cough variant asthma, COVID-19 (07/2021), Expressive language disorder (4/5/2021), GERD (gastroesophageal reflux disease), Hypercholesteremia (09/11/2018), Prediabetes (08/07/2018), Stroke, and TIA (transient ischemic attack) (4/5/2021).    PSHX:  has a past surgical history that includes Appendectomy and Back surgery.    PFHX:  "family history includes Arthritis in her maternal grandfather; Cancer in her father; Diabetes in her brother; No Known Problems in her daughter, maternal grandmother, paternal grandfather, paternal grandmother, sister, son, and son; Spine Surgery in her mother; Throat cancer in her mother.    PSlHX:  reports that she has never smoked. She has never used smokeless tobacco. She reports that she does not currently use alcohol. She reports that she does not use drugs.        Review of patient's allergies indicates:   Allergen Reactions    Sulfa (sulfonamide antibiotics) Hives and Edema       Medication List with Changes/Refills   Current Medications    ASPIRIN 81 MG CHEW    Take 81 mg by mouth once daily.    AZELASTINE (ASTELIN) 137 MCG (0.1 %) NASAL SPRAY    2 sprays 2 (two) times daily.    CALCIUM CITRATE (CALCITRATE) 200 MG (950 MG) TABLET    Take 1 tablet by mouth once daily.    MULTIVITAMIN CAPSULE    Take 1 capsule by mouth once daily.    OMEPRAZOLE (PRILOSEC) 40 MG CAPSULE    Take 1 capsule (40 mg total) by mouth every morning.    POLYETHYLENE GLYCOL (GLYCOLAX) 17 GRAM/DOSE POWDER    Take 17 g by mouth every other day.        Objective Findings:  Vital Signs:  /63   Pulse 84   Ht 5' 5" (1.651 m)   Wt 63.9 kg (140 lb 12.8 oz)   SpO2 97%   BMI 23.43 kg/m²  Body mass index is 23.43 kg/m².    Physical Exam:  Physical Exam  Vitals and nursing note reviewed.   Constitutional:       General: She is not in acute distress.     Appearance: Normal appearance.   HENT:      Mouth/Throat:      Mouth: Mucous membranes are moist.   Cardiovascular:      Rate and Rhythm: Normal rate.   Pulmonary:      Effort: Pulmonary effort is normal.      Breath sounds: No wheezing, rhonchi or rales.   Abdominal:      General: There is no distension.      Palpations: There is no mass.      Tenderness: There is no abdominal tenderness. There is no guarding.   Skin:     General: Skin is warm and dry.      Coloration: Skin is not " jaundiced or pale.   Neurological:      Mental Status: She is alert and oriented to person, place, and time.   Psychiatric:         Mood and Affect: Mood normal.        Labs:  Lab Results   Component Value Date    WBC 5.01 11/08/2022    HGB 14.5 11/08/2022    HCT 44.3 11/08/2022    MCV 89.3 11/08/2022    RDW 13.2 11/08/2022     11/08/2022    LYMPH 29.9 11/08/2022    LYMPH 1.50 11/08/2022    MONO 8.8 (H) 11/08/2022    EOS 0.34 11/08/2022    BASO 0.09 11/08/2022     Lab Results   Component Value Date     11/08/2022    K 4.0 11/08/2022     11/08/2022    CO2 24 11/08/2022     (H) 11/08/2022    BUN 23 (H) 11/08/2022    CREATININE 1.25 (H) 11/08/2022    CALCIUM 10.0 11/08/2022    PROT 7.6 11/08/2022    ALBUMIN 4.1 11/08/2022    BILITOT 0.5 11/08/2022    ALKPHOS 108 11/08/2022    AST 25 11/08/2022    ALT 30 11/08/2022         Imaging: NM Gastric Emptying    Result Date: 4/17/2023  EXAMINATION: NM GASTRIC EMPTYING CLINICAL HISTORY: Early satiety COMPARISON: None. FINDINGS: The patient ingested 500 microcuries of technetium 99 M sulfur colloid in semi-solid meal. Time to half gastric emptying is undefined No abnormality on the images.     Gastric emptying time as described above. Electronically signed by: Miguel Soto Date:    04/17/2023 Time:    10:17        Assessment:  Lynne Grimaldo is a 72 y.o. female here with:  1. Gastroparesis    2. Epigastric fullness    3. Early satiety          Recommendations:  1. Gastroparesis diet given to the patient  2. Do not lay down within 3 hours of eating.  Avoid spicy, greasy foods  Eat 6-8 small meals per day.  Exercise 150 minutes per week  Avoid raw fruits and vegetables  Small amount of protein and fiber at one time  3. Continue Omeprazole  4. EGD in 2024 due to intestinal metaplasia    Follow up in about 6 months (around 11/2/2023).      Order summary:       Thank you for allowing me to participate in the care of Lynne Grimaldo.      [employer]  TOBY [employer]

## 2023-06-09 DIAGNOSIS — Z71.89 COMPLEX CARE COORDINATION: ICD-10-CM

## 2023-06-22 ENCOUNTER — OFFICE VISIT (OUTPATIENT)
Dept: FAMILY MEDICINE | Facility: CLINIC | Age: 73
End: 2023-06-22
Payer: MEDICARE

## 2023-06-22 VITALS
WEIGHT: 140 LBS | SYSTOLIC BLOOD PRESSURE: 135 MMHG | HEIGHT: 65 IN | HEART RATE: 77 BPM | RESPIRATION RATE: 20 BRPM | BODY MASS INDEX: 23.32 KG/M2 | OXYGEN SATURATION: 99 % | TEMPERATURE: 99 F | DIASTOLIC BLOOD PRESSURE: 70 MMHG

## 2023-06-22 DIAGNOSIS — N18.32 STAGE 3B CHRONIC KIDNEY DISEASE: Chronic | ICD-10-CM

## 2023-06-22 DIAGNOSIS — J45.991 COUGH VARIANT ASTHMA: Chronic | ICD-10-CM

## 2023-06-22 DIAGNOSIS — J02.9 ACUTE VIRAL PHARYNGITIS: Primary | ICD-10-CM

## 2023-06-22 LAB
CTP QC/QA: YES
S PYO RRNA THROAT QL PROBE: NEGATIVE

## 2023-06-22 PROCEDURE — 96372 PR INJECTION,THERAP/PROPH/DIAG2ST, IM OR SUBCUT: ICD-10-PCS | Mod: ,,, | Performed by: NURSE PRACTITIONER

## 2023-06-22 PROCEDURE — 99214 PR OFFICE/OUTPT VISIT, EST, LEVL IV, 30-39 MIN: ICD-10-PCS | Mod: ,,, | Performed by: NURSE PRACTITIONER

## 2023-06-22 PROCEDURE — 96372 THER/PROPH/DIAG INJ SC/IM: CPT | Mod: ,,, | Performed by: NURSE PRACTITIONER

## 2023-06-22 PROCEDURE — 87880 STREP A ASSAY W/OPTIC: CPT | Mod: RHCUB | Performed by: NURSE PRACTITIONER

## 2023-06-22 PROCEDURE — 99214 OFFICE O/P EST MOD 30 MIN: CPT | Mod: ,,, | Performed by: NURSE PRACTITIONER

## 2023-06-22 RX ORDER — METHYLPREDNISOLONE ACETATE 40 MG/ML
40 INJECTION, SUSPENSION INTRA-ARTICULAR; INTRALESIONAL; INTRAMUSCULAR; SOFT TISSUE
Status: COMPLETED | OUTPATIENT
Start: 2023-06-22 | End: 2023-06-22

## 2023-06-22 RX ORDER — CETIRIZINE HYDROCHLORIDE 10 MG/1
10 TABLET ORAL EVERY OTHER DAY
COMMUNITY

## 2023-06-22 RX ORDER — DEXAMETHASONE SODIUM PHOSPHATE 4 MG/ML
4 INJECTION, SOLUTION INTRA-ARTICULAR; INTRALESIONAL; INTRAMUSCULAR; INTRAVENOUS; SOFT TISSUE
Status: COMPLETED | OUTPATIENT
Start: 2023-06-22 | End: 2023-06-22

## 2023-06-22 RX ADMIN — DEXAMETHASONE SODIUM PHOSPHATE 4 MG: 4 INJECTION, SOLUTION INTRA-ARTICULAR; INTRALESIONAL; INTRAMUSCULAR; INTRAVENOUS; SOFT TISSUE at 09:06

## 2023-06-22 RX ADMIN — METHYLPREDNISOLONE ACETATE 40 MG: 40 INJECTION, SUSPENSION INTRA-ARTICULAR; INTRALESIONAL; INTRAMUSCULAR; SOFT TISSUE at 09:06

## 2023-06-22 NOTE — PROGRESS NOTES
[facility] - FAMILY MEDICINE       PATIENT NAME: Lynne Grimaldo   : 1950    AGE: 72 y.o. DATE OF ENCOUNTER: 23   MRN: [id number]      Visit type: WALK-IN  PCP:  SHANNAN Mane    Reason for Visit / Chief Complaint: Sore Throat (Patient presents to the clinic complaint of sore throat/Rm2)     Subjective:     Presents c/o sore throat a few days but worsened last night.  Thought she saw something on the left side of her throat.  No purulent drainage, no fever; no variance from usual chronic rhinitis and chronic cough.    Review of Systems:     Review of Systems   Constitutional:  Positive for fatigue. Negative for diaphoresis and fever.   HENT:  Positive for congestion, postnasal drip, sinus pressure and sore throat. Negative for voice change.         Chronic rhinitis   Respiratory:  Positive for cough. Negative for shortness of breath and wheezing.         Chronic cough with sputum production every morning.   Cardiovascular: Negative.    Gastrointestinal: Negative.    Skin: Negative.    Neurological:  Positive for headaches.     Allergies and Meds:     Review of patient's allergies indicates:   Allergen Reactions    Sulfa (sulfonamide antibiotics) Hives and Edema        Current Outpatient Medications:     aspirin 81 MG Chew, Take 81 mg by mouth once daily., Disp: , Rfl:     azelastine (ASTELIN) 137 mcg (0.1 %) nasal spray, 2 sprays 2 (two) times daily., Disp: , Rfl:     calcium citrate (CALCITRATE) 200 mg (950 mg) tablet, Take 1 tablet by mouth once daily., Disp: , Rfl:     cetirizine (ZYRTEC) 10 MG tablet, Take 10 mg by mouth every other day., Disp: , Rfl:     multivitamin capsule, Take 1 capsule by mouth once daily., Disp: , Rfl:     omeprazole (PRILOSEC) 40 MG capsule, Take 1 capsule (40 mg total) by mouth every morning., Disp: 30 capsule, Rfl: 5    polyethylene glycol (GLYCOLAX) 17 gram/dose powder, Take 17 g by mouth every other day., Disp: , Rfl:   No current  "facility-administered medications for this visit.    Medical History:     Past Medical History:   Diagnosis Date    Back pain     Chronic kidney disease, stage 3b 08/01/2018    GFR 44 at 1st visit 8/1/18    Cough variant asthma     COVID-19 07/2021    Expressive language disorder 4/5/2021    GERD (gastroesophageal reflux disease)     Hypercholesteremia 09/11/2018    Prediabetes 08/07/2018 8/7/18 A1c 6.5%, didn't take med; Nov 2018 6.1%; Dec 2020 6.0%    Stroke     TIA (transient ischemic attack) 4/5/2021      Objective:     /70 (BP Location: Right arm, Patient Position: Sitting, BP Method: Medium (Automatic))   Pulse 77   Temp 98.6 °F (37 °C) (Oral)   Resp 20   Ht 5' 5" (1.651 m)   Wt 63.5 kg (140 lb)   SpO2 99%   BMI 23.30 kg/m²   Body mass index is 23.3 kg/m².     Physical Exam:    Physical Exam  Vitals and nursing note reviewed.   Constitutional:       General: She is not in acute distress.     Appearance: Normal appearance. She is not ill-appearing.   HENT:      Head: Normocephalic.      Right Ear: Tympanic membrane, ear canal and external ear normal.      Left Ear: Tympanic membrane, ear canal and external ear normal.      Nose: Mucosal edema present. No rhinorrhea.      Mouth/Throat:      Mouth: Mucous membranes are moist.      Pharynx: Uvula midline. Posterior oropharyngeal erythema (ulceration noted to left posterior pharynx) present. No oropharyngeal exudate or uvula swelling.   Eyes:      Conjunctiva/sclera: Conjunctivae normal.   Cardiovascular:      Rate and Rhythm: Normal rate and regular rhythm.      Heart sounds: Normal heart sounds.   Pulmonary:      Effort: Pulmonary effort is normal. No respiratory distress.      Breath sounds: Normal breath sounds.   Musculoskeletal:      Cervical back: Neck supple.   Skin:     General: Skin is warm and dry.   Neurological:      Mental Status: She is alert and oriented to person, place, and time.       Assessment and Plan:        1. Acute viral " pharyngitis  -     POCT rapid strep A  -     methylPREDNISolone acetate injection 40 mg  -     dexAMETHasone injection 4 mg    2. Stage 3b chronic kidney disease  Comments:  Stable  Declines taking ACEI or ARB.    3. Cough variant asthma  -     methylPREDNISolone acetate injection 40 mg  -     dexAMETHasone injection 4 mg      Office Visit on 06/22/2023   Component Date Value Ref Range Status    Rapid Strep A Screen 06/22/2023 Negative  Negative Final     Acceptable 06/22/2023 Yes   Final        Discussed diagnosis and treatment of URI.  Discussed the importance of avoiding unnecessary antibiotic therapy.  Call in a few days if symptoms aren't resolving.     F/u as needed or if symptoms worsen or persist.  Future Appointments   Date Time Provider Department Center   11/7/2023  2:00 PM SHANNAN [facility]       Signature: Kristyn CAMPBELL-BC

## 2023-06-24 ENCOUNTER — PATIENT MESSAGE (OUTPATIENT)
Dept: FAMILY MEDICINE | Facility: CLINIC | Age: 73
End: 2023-06-24
Payer: MEDICARE

## 2023-06-24 DIAGNOSIS — J40 BRONCHITIS: Primary | ICD-10-CM

## 2023-06-25 RX ORDER — PROMETHAZINE HYDROCHLORIDE AND DEXTROMETHORPHAN HYDROBROMIDE 6.25; 15 MG/5ML; MG/5ML
5 SYRUP ORAL EVERY 4 HOURS PRN
Qty: 240 ML | Refills: 0 | Status: SHIPPED | OUTPATIENT
Start: 2023-06-25 | End: 2023-10-13

## 2023-06-25 RX ORDER — AZITHROMYCIN 250 MG/1
TABLET, FILM COATED ORAL
Qty: 6 TABLET | Refills: 0 | Status: SHIPPED | OUTPATIENT
Start: 2023-06-25 | End: 2023-10-13

## 2023-06-25 RX ORDER — METHYLPREDNISOLONE 4 MG/1
TABLET ORAL
Qty: 21 TABLET | Refills: 0 | Status: SHIPPED | OUTPATIENT
Start: 2023-06-25 | End: 2023-10-13

## 2023-07-25 ENCOUNTER — HOSPITAL ENCOUNTER (OUTPATIENT)
Dept: RADIOLOGY | Facility: HOSPITAL | Age: 73
Discharge: HOME | End: 2023-07-25
Attending: NURSE PRACTITIONER
Payer: MEDICARE

## 2023-07-25 DIAGNOSIS — Z12.31 OTHER SCREENING MAMMOGRAM: ICD-10-CM

## 2023-07-25 PROCEDURE — 77067 SCR MAMMO BI INCL CAD: CPT | Mod: TC

## 2023-10-13 ENCOUNTER — OFFICE VISIT (OUTPATIENT)
Dept: FAMILY MEDICINE | Facility: CLINIC | Age: 73
End: 2023-10-13
Payer: MEDICARE

## 2023-10-13 VITALS
TEMPERATURE: 98 F | DIASTOLIC BLOOD PRESSURE: 60 MMHG | HEIGHT: 65 IN | SYSTOLIC BLOOD PRESSURE: 124 MMHG | OXYGEN SATURATION: 99 % | BODY MASS INDEX: 23.61 KG/M2 | RESPIRATION RATE: 16 BRPM | WEIGHT: 141.69 LBS | HEART RATE: 85 BPM

## 2023-10-13 DIAGNOSIS — N30.90 CYSTITIS: Primary | ICD-10-CM

## 2023-10-13 DIAGNOSIS — R30.0 DYSURIA: ICD-10-CM

## 2023-10-13 LAB
BILIRUB SERPL-MCNC: NEGATIVE MG/DL
BLOOD URINE, POC: NEGATIVE
CLARITY, POC UA: CLEAR
COLOR, POC UA: YELLOW
GLUCOSE UR QL STRIP: NEGATIVE
KETONES UR QL STRIP: NEGATIVE
LEUKOCYTE ESTERASE URINE, POC: NORMAL
NITRITE, POC UA: NEGATIVE
PH, POC UA: 5
PROTEIN, POC: NORMAL
SPECIFIC GRAVITY, POC UA: 1.01
UROBILINOGEN, POC UA: 0.2

## 2023-10-13 PROCEDURE — 87086 URINE CULTURE/COLONY COUNT: CPT | Mod: ,,, | Performed by: CLINICAL MEDICAL LABORATORY

## 2023-10-13 PROCEDURE — 81002 POCT URINE DIPSTICK WITHOUT MICROSCOPE: ICD-10-PCS | Mod: ,,, | Performed by: NURSE PRACTITIONER

## 2023-10-13 PROCEDURE — 87077 CULTURE, URINE: ICD-10-PCS | Mod: ,,, | Performed by: CLINICAL MEDICAL LABORATORY

## 2023-10-13 PROCEDURE — 87186 SC STD MICRODIL/AGAR DIL: CPT | Mod: ,,, | Performed by: CLINICAL MEDICAL LABORATORY

## 2023-10-13 PROCEDURE — 81002 URINALYSIS NONAUTO W/O SCOPE: CPT | Mod: ,,, | Performed by: NURSE PRACTITIONER

## 2023-10-13 PROCEDURE — 99213 PR OFFICE/OUTPT VISIT, EST, LEVL III, 20-29 MIN: ICD-10-PCS | Mod: 25,,, | Performed by: NURSE PRACTITIONER

## 2023-10-13 PROCEDURE — 96372 THER/PROPH/DIAG INJ SC/IM: CPT | Mod: ,,, | Performed by: NURSE PRACTITIONER

## 2023-10-13 PROCEDURE — 96372 PR INJECTION,THERAP/PROPH/DIAG2ST, IM OR SUBCUT: ICD-10-PCS | Mod: ,,, | Performed by: NURSE PRACTITIONER

## 2023-10-13 PROCEDURE — 87186 CULTURE, URINE: ICD-10-PCS | Mod: ,,, | Performed by: CLINICAL MEDICAL LABORATORY

## 2023-10-13 PROCEDURE — 87077 CULTURE AEROBIC IDENTIFY: CPT | Mod: ,,, | Performed by: CLINICAL MEDICAL LABORATORY

## 2023-10-13 PROCEDURE — 99213 OFFICE O/P EST LOW 20 MIN: CPT | Mod: 25,,, | Performed by: NURSE PRACTITIONER

## 2023-10-13 PROCEDURE — 87086 CULTURE, URINE: ICD-10-PCS | Mod: ,,, | Performed by: CLINICAL MEDICAL LABORATORY

## 2023-10-13 RX ORDER — CEFTRIAXONE 1 G/1
1 INJECTION, POWDER, FOR SOLUTION INTRAMUSCULAR; INTRAVENOUS
Status: COMPLETED | OUTPATIENT
Start: 2023-10-13 | End: 2023-10-13

## 2023-10-13 RX ORDER — NITROFURANTOIN 25; 75 MG/1; MG/1
100 CAPSULE ORAL 2 TIMES DAILY
Qty: 10 CAPSULE | Refills: 0 | Status: SHIPPED | OUTPATIENT
Start: 2023-10-13 | End: 2023-10-18

## 2023-10-13 RX ADMIN — CEFTRIAXONE 1 G: 1 INJECTION, POWDER, FOR SOLUTION INTRAMUSCULAR; INTRAVENOUS at 01:10

## 2023-10-13 NOTE — PATIENT INSTRUCTIONS
Rx as directed. Increase water intake. C&S sent, will call if results warrant a change in antibiotics. For females, always wipe front to back. RTC if worsening or persisting of symptoms after 48-72 hours of antibiotic therapy.

## 2023-10-13 NOTE — PROGRESS NOTES
"Subjective:      Lynne Grimaldo is a 73 y.o. female who complains of burning with urination. She has had symptoms for 3 days. Patient also complains of back pain. Patient denies fever and vaginal discharge. Patient does not have a history of recurrent UTI. Patient does not have a history of pyelonephritis.     Review of Systems  Pertinent items are noted in HPI.      Objective:      /60 (BP Location: Left arm, Patient Position: Sitting, BP Method: Medium (Manual))   Pulse 85   Temp 98.2 °F (36.8 °C) (Oral)   Resp 16   Ht 5' 5" (1.651 m)   Wt 64.3 kg (141 lb 11.2 oz)   SpO2 99%   BMI 23.58 kg/m²   General appearance: alert, appears stated age, and cooperative  Back: symmetric, no curvature. ROM normal. No CVA tenderness.  Lungs: clear to auscultation bilaterally  Heart: regular rate and rhythm, S1, S2 normal, no murmur, click, rub or gallop  Extremities: extremities normal, atraumatic, no cyanosis or edema  Skin: Skin color, texture, turgor normal. No rashes or lesions  Neurologic: Alert and oriented X 3, normal strength and tone. Normal symmetric reflexes. Normal coordination and gait    Laboratory:   Urine dipstick:  see results .    Micro exam: not done.      Assessment:      Acute cystitis and UTI       Plan:      Medications: nitrofurantoin.  Maintain adequate hydration.  Follow up if symptoms not improving, and as needed.   "

## 2023-10-15 LAB — UA COMPLETE W REFLEX CULTURE PNL UR: ABNORMAL

## 2023-11-07 ENCOUNTER — OFFICE VISIT (OUTPATIENT)
Dept: GASTROENTEROLOGY | Facility: CLINIC | Age: 73
End: 2023-11-07
Payer: MEDICARE

## 2023-11-07 VITALS
DIASTOLIC BLOOD PRESSURE: 59 MMHG | SYSTOLIC BLOOD PRESSURE: 114 MMHG | WEIGHT: 139.38 LBS | BODY MASS INDEX: 23.22 KG/M2 | HEIGHT: 65 IN | HEART RATE: 93 BPM

## 2023-11-07 DIAGNOSIS — R68.81 EARLY SATIETY: ICD-10-CM

## 2023-11-07 DIAGNOSIS — K59.00 CONSTIPATION, UNSPECIFIED CONSTIPATION TYPE: ICD-10-CM

## 2023-11-07 DIAGNOSIS — R19.06 EPIGASTRIC FULLNESS: ICD-10-CM

## 2023-11-07 DIAGNOSIS — K21.9 GASTROESOPHAGEAL REFLUX DISEASE WITHOUT ESOPHAGITIS: Chronic | ICD-10-CM

## 2023-11-07 DIAGNOSIS — K31.84 GASTROPARESIS: Primary | ICD-10-CM

## 2023-11-07 PROCEDURE — 99213 OFFICE O/P EST LOW 20 MIN: CPT | Mod: PBBFAC | Performed by: NURSE PRACTITIONER

## 2023-11-07 PROCEDURE — 99213 OFFICE O/P EST LOW 20 MIN: CPT | Mod: S$PBB,,, | Performed by: NURSE PRACTITIONER

## 2023-11-07 PROCEDURE — 99213 PR OFFICE/OUTPT VISIT, EST, LEVL III, 20-29 MIN: ICD-10-PCS | Mod: S$PBB,,, | Performed by: NURSE PRACTITIONER

## 2023-11-07 RX ORDER — OMEPRAZOLE 40 MG/1
40 CAPSULE, DELAYED RELEASE ORAL EVERY MORNING
Qty: 90 CAPSULE | Refills: 1 | Status: SHIPPED | OUTPATIENT
Start: 2023-11-07 | End: 2024-05-05

## 2023-11-07 NOTE — PATIENT INSTRUCTIONS
Do not lay down within 3 hours of eating.  Avoid spicy, greasy foods  Eat 6-8 small meals per day.  Exercise 150 minutes per week  Avoid raw fruits and vegetables  Small amount of protein and fiber at one time   Prilosec 20 mg daily

## 2023-11-07 NOTE — PROGRESS NOTES
Lynne Grimaldo is a 73 y.o. female here for Follow-up        PCP: Kristyn Tavera  Referring Provider: No referring provider defined for this encounter.     HPI:  Presents for follow-up appointment due to gastroparesis.  Patient reports that she is doing better.  Occasional nausea.  No vomiting.  States that she did try to stop PPI but was unable to be off the medication due to reflux.  EGD dilation 11/15/2021, mild erythema, intestinal metaplasia.  Recommendation to repeat EGD in 2024.  States that she is trying to follow a low residue small frequent meal diet.  HIDA scan 01/17/2022, normal.  Abdominal ultrasound on 01/17/2022, no acute abnormalities.    Follow-up  Pertinent negatives include no abdominal pain, change in bowel habit, chest pain, fatigue, fever, nausea or vomiting.         ROS:  Review of Systems   Constitutional:  Negative for appetite change, fatigue, fever and unexpected weight change.   HENT:  Negative for trouble swallowing.    Cardiovascular:  Negative for chest pain.   Gastrointestinal:  Positive for constipation and reflux. Negative for abdominal pain, blood in stool, change in bowel habit, diarrhea, nausea and vomiting.   Musculoskeletal:  Negative for gait problem.   Integumentary:  Negative for pallor.   Psychiatric/Behavioral:  The patient is not nervous/anxious.           PMHX:  has a past medical history of Back pain, Chronic kidney disease, stage 3b (08/01/2018), Cough variant asthma, COVID-19 (07/2021), Expressive language disorder (4/5/2021), GERD (gastroesophageal reflux disease), Hypercholesteremia (09/11/2018), Prediabetes (08/07/2018), Stroke, and TIA (transient ischemic attack) (4/5/2021).    PSHX:  has a past surgical history that includes Appendectomy and Back surgery.    PFHX: family history includes Arthritis in her maternal grandfather; Cancer in her father; Diabetes in her brother; No Known Problems in her daughter, maternal grandmother, paternal  "grandfather, paternal grandmother, sister, son, and son; Spine Surgery in her mother; Throat cancer in her mother.    PSlHX:  reports that she has never smoked. She has never used smokeless tobacco. She reports that she does not currently use alcohol. She reports that she does not use drugs.        Review of patient's allergies indicates:   Allergen Reactions    Sulfa (sulfonamide antibiotics) Hives and Edema       Medication List with Changes/Refills   Current Medications    ASPIRIN 81 MG CHEW    Take 81 mg by mouth once daily.    AZELASTINE (ASTELIN) 137 MCG (0.1 %) NASAL SPRAY    2 sprays 2 (two) times daily.    CALCIUM CITRATE (CALCITRATE) 200 MG (950 MG) TABLET    Take 1 tablet by mouth once daily.    CETIRIZINE (ZYRTEC) 10 MG TABLET    Take 10 mg by mouth every other day.    MULTIVITAMIN CAPSULE    Take 1 capsule by mouth once daily.    POLYETHYLENE GLYCOL (GLYCOLAX) 17 GRAM/DOSE POWDER    Take 17 g by mouth every other day.   Changed and/or Refilled Medications    Modified Medication Previous Medication    OMEPRAZOLE (PRILOSEC) 40 MG CAPSULE omeprazole (PRILOSEC) 40 MG capsule       Take 1 capsule (40 mg total) by mouth every morning.    Take 1 capsule (40 mg total) by mouth every morning.        Objective Findings:  Vital Signs:  BP (!) 114/59   Pulse 93   Ht 5' 5" (1.651 m)   Wt 63.2 kg (139 lb 6.4 oz)   BMI 23.20 kg/m²  Body mass index is 23.2 kg/m².    Physical Exam:  Physical Exam  Vitals and nursing note reviewed.   Constitutional:       General: She is not in acute distress.     Appearance: Normal appearance.   HENT:      Mouth/Throat:      Mouth: Mucous membranes are moist.   Cardiovascular:      Rate and Rhythm: Normal rate.   Pulmonary:      Effort: Pulmonary effort is normal.      Breath sounds: No wheezing, rhonchi or rales.   Abdominal:      General: Bowel sounds are normal. There is no distension.      Palpations: Abdomen is soft. There is no mass.      Tenderness: There is no abdominal " tenderness. There is no guarding.      Hernia: No hernia is present.   Skin:     General: Skin is warm and dry.      Coloration: Skin is not jaundiced or pale.   Neurological:      Mental Status: She is alert and oriented to person, place, and time.   Psychiatric:         Mood and Affect: Mood normal.          Labs:  Lab Results   Component Value Date    WBC 5.01 11/08/2022    HGB 14.5 11/08/2022    HCT 44.3 11/08/2022    MCV 89.3 11/08/2022    RDW 13.2 11/08/2022     11/08/2022    LYMPH 29.9 11/08/2022    LYMPH 1.50 11/08/2022    MONO 8.8 (H) 11/08/2022    EOS 0.34 11/08/2022    BASO 0.09 11/08/2022     Lab Results   Component Value Date     11/08/2022    K 4.0 11/08/2022     11/08/2022    CO2 24 11/08/2022     (H) 11/08/2022    BUN 23 (H) 11/08/2022    CREATININE 1.25 (H) 11/08/2022    CALCIUM 10.0 11/08/2022    PROT 7.6 11/08/2022    ALBUMIN 4.1 11/08/2022    BILITOT 0.5 11/08/2022    ALKPHOS 108 11/08/2022    AST 25 11/08/2022    ALT 30 11/08/2022         Imaging: No results found.      Assessment:  Lynne Grimaldo is a 73 y.o. female here with:  1. Gastroparesis    2. Gastroesophageal reflux disease without esophagitis    3. Early satiety    4. Epigastric fullness    5. Constipation, unspecified constipation type          Recommendations:  1. Do not lay down within 3 hours of eating.  Avoid spicy, greasy foods  Eat 6-8 small meals per day.  Exercise 150 minutes per week  Avoid raw fruits and vegetables  Small amount of protein and fiber at one time  2. Prilosec 20 mg daily  3. MiraLax powder 17 g daily for constipation    Follow up in about 6 months (around 5/7/2024).      Order summary:  Orders Placed This Encounter    omeprazole (PRILOSEC) 40 MG capsule       Thank you for allowing me to participate in the care of Lynne Grimaldo.      SHANNAN Grayson

## 2023-12-19 LAB
LEFT EYE DM RETINOPATHY: NEGATIVE
RIGHT EYE DM RETINOPATHY: NEGATIVE

## 2023-12-27 ENCOUNTER — PATIENT OUTREACH (OUTPATIENT)
Dept: ADMINISTRATIVE | Facility: HOSPITAL | Age: 73
End: 2023-12-27

## 2023-12-27 NOTE — PROGRESS NOTES
Population Health Chart Review & Patient Outreach Details    Updates Requested / Reviewed:  [x]  Care Team Updated    Health Maintenance Topics Addressed and Outreach Outcomes / Actions Taken:          Diabetic Eye Exam [x] HM Updated with December 2023 Eye Exam (Dr. Ledezma). History Updated.

## 2024-01-06 ENCOUNTER — OFFICE VISIT (OUTPATIENT)
Dept: FAMILY MEDICINE | Facility: CLINIC | Age: 74
End: 2024-01-06
Payer: MEDICARE

## 2024-01-06 VITALS
TEMPERATURE: 99 F | RESPIRATION RATE: 18 BRPM | SYSTOLIC BLOOD PRESSURE: 130 MMHG | HEART RATE: 97 BPM | OXYGEN SATURATION: 98 % | DIASTOLIC BLOOD PRESSURE: 60 MMHG

## 2024-01-06 DIAGNOSIS — J40 BRONCHITIS: Primary | ICD-10-CM

## 2024-01-06 PROCEDURE — 96372 THER/PROPH/DIAG INJ SC/IM: CPT | Mod: ,,, | Performed by: FAMILY MEDICINE

## 2024-01-06 PROCEDURE — 99214 OFFICE O/P EST MOD 30 MIN: CPT | Mod: ,,, | Performed by: FAMILY MEDICINE

## 2024-01-06 RX ORDER — LEVOFLOXACIN 500 MG/1
500 TABLET, FILM COATED ORAL DAILY
Qty: 7 TABLET | Refills: 0 | Status: SHIPPED | OUTPATIENT
Start: 2024-01-06 | End: 2024-01-13

## 2024-01-06 RX ORDER — BENZONATATE 100 MG/1
100 CAPSULE ORAL 3 TIMES DAILY PRN
Qty: 20 CAPSULE | Refills: 0 | Status: SHIPPED | OUTPATIENT
Start: 2024-01-06

## 2024-01-06 RX ORDER — DEXAMETHASONE SODIUM PHOSPHATE 4 MG/ML
4 INJECTION, SOLUTION INTRA-ARTICULAR; INTRALESIONAL; INTRAMUSCULAR; INTRAVENOUS; SOFT TISSUE
Status: COMPLETED | OUTPATIENT
Start: 2024-01-06 | End: 2024-01-06

## 2024-01-06 RX ORDER — PREDNISONE 20 MG/1
20 TABLET ORAL DAILY
Qty: 5 TABLET | Refills: 0 | Status: SHIPPED | OUTPATIENT
Start: 2024-01-06 | End: 2024-01-11

## 2024-01-06 RX ORDER — CEFTRIAXONE 500 MG/1
500 INJECTION, POWDER, FOR SOLUTION INTRAMUSCULAR; INTRAVENOUS
Status: COMPLETED | OUTPATIENT
Start: 2024-01-06 | End: 2024-01-06

## 2024-01-06 RX ADMIN — DEXAMETHASONE SODIUM PHOSPHATE 4 MG: 4 INJECTION, SOLUTION INTRA-ARTICULAR; INTRALESIONAL; INTRAMUSCULAR; INTRAVENOUS; SOFT TISSUE at 02:01

## 2024-01-06 RX ADMIN — CEFTRIAXONE 500 MG: 500 INJECTION, POWDER, FOR SOLUTION INTRAMUSCULAR; INTRAVENOUS at 02:01

## 2024-01-06 NOTE — PROGRESS NOTES
Subjective:       Patient ID: Lynne Grimaldo is a 73 y.o. female.    Chief Complaint: Cough (X3 days. No other symptoms)    Cough  Associated symptoms include postnasal drip. Pertinent negatives include no chest pain, chills, ear pain, fever, headaches, myalgias, rash, rhinorrhea, sore throat, shortness of breath or wheezing. There is no history of environmental allergies.     Review of Systems   Constitutional:  Negative for activity change, appetite change, chills, diaphoresis, fatigue, fever and unexpected weight change.   HENT:  Positive for nasal congestion, postnasal drip and sinus pressure/congestion. Negative for dental problem, drooling, ear discharge, ear pain, facial swelling, hearing loss, mouth sores, nosebleeds, rhinorrhea, sneezing, sore throat, tinnitus, trouble swallowing, voice change and goiter.    Eyes:  Negative for photophobia, discharge, itching and visual disturbance.   Respiratory:  Positive for cough. Negative for apnea, choking, chest tightness, shortness of breath, wheezing and stridor.    Cardiovascular:  Negative for chest pain, palpitations, leg swelling and claudication.   Gastrointestinal:  Negative for abdominal distention, abdominal pain, anal bleeding, blood in stool, change in bowel habit, constipation, diarrhea, nausea and vomiting.   Endocrine: Negative for cold intolerance, heat intolerance, polydipsia, polyphagia and polyuria.   Genitourinary:  Negative for bladder incontinence, decreased urine volume, difficulty urinating, dysuria, enuresis, flank pain, frequency, hematuria, nocturia, pelvic pain and urgency.   Musculoskeletal:  Negative for arthralgias, back pain, gait problem, joint swelling, leg pain, myalgias, neck pain, neck stiffness and joint deformity.   Integumentary:  Negative for pallor, rash, wound, breast mass and breast tenderness.   Allergic/Immunologic: Negative for environmental allergies, food allergies and immunocompromised state.   Neurological:   Negative for dizziness, vertigo, tremors, seizures, syncope, facial asymmetry, speech difficulty, weakness, light-headedness, numbness, headaches, memory loss and coordination difficulties.   Hematological:  Negative for adenopathy. Does not bruise/bleed easily.   Psychiatric/Behavioral:  Negative for agitation, behavioral problems, confusion, decreased concentration, dysphoric mood, hallucinations, self-injury, sleep disturbance and suicidal ideas. The patient is not nervous/anxious and is not hyperactive.    Breast: Negative for mass and tenderness        Objective:      Physical Exam  Vitals reviewed.   Constitutional:       Appearance: Normal appearance.   HENT:      Head: Normocephalic and atraumatic.      Right Ear: Tympanic membrane, ear canal and external ear normal.      Left Ear: Tympanic membrane, ear canal and external ear normal.      Nose: Congestion and rhinorrhea present.      Mouth/Throat:      Mouth: Mucous membranes are moist.      Pharynx: Oropharynx is clear.   Eyes:      Extraocular Movements: Extraocular movements intact.      Conjunctiva/sclera: Conjunctivae normal.      Pupils: Pupils are equal, round, and reactive to light.   Cardiovascular:      Rate and Rhythm: Normal rate and regular rhythm.      Pulses: Normal pulses.      Heart sounds: Normal heart sounds.   Pulmonary:      Effort: Pulmonary effort is normal.      Breath sounds: Rhonchi present.   Abdominal:      General: Bowel sounds are normal.      Palpations: Abdomen is soft.   Musculoskeletal:         General: Normal range of motion.      Cervical back: Normal range of motion and neck supple.   Skin:     General: Skin is warm and dry.   Neurological:      General: No focal deficit present.      Mental Status: She is alert. Mental status is at baseline.   Psychiatric:         Mood and Affect: Mood normal.         Behavior: Behavior normal.         Thought Content: Thought content normal.         Judgment: Judgment normal.          Assessment:       1. Bronchitis        Plan:     Bronchitis  -     cefTRIAXone injection 500 mg  -     dexAMETHasone injection 4 mg  -     levoFLOXacin (LEVAQUIN) 500 MG tablet; Take 1 tablet (500 mg total) by mouth once daily. for 7 days  Dispense: 7 tablet; Refill: 0  -     predniSONE (DELTASONE) 20 MG tablet; Take 1 tablet (20 mg total) by mouth once daily. for 5 days  Dispense: 5 tablet; Refill: 0  -     benzonatate (TESSALON) 100 MG capsule; Take 1 capsule (100 mg total) by mouth 3 (three) times daily as needed for Cough.  Dispense: 20 capsule; Refill: 0

## 2024-01-09 DIAGNOSIS — Z71.89 COMPLEX CARE COORDINATION: ICD-10-CM

## 2024-04-24 PROCEDURE — 85384 FIBRINOGEN ACTIVITY: CPT | Performed by: FAMILY MEDICINE

## 2024-06-05 DIAGNOSIS — Z12.31 BREAST CANCER SCREENING BY MAMMOGRAM: Primary | ICD-10-CM

## 2024-06-12 NOTE — PROGRESS NOTES
"   [facility] MEDICINE       PATIENT NAME: Lynne Grimaldo   : 1950    AGE: 73 y.o. DATE OF ENCOUNTER: 24    MRN: [id number]      Lynne Grimaldo presented for a  Medicare AWV and comprehensive Health Risk Assessment today. The following components were reviewed and updated:    Medical history  Family History  Social history  Allergies and Current Medications  Health Risk Assessment  Health Maintenance  Care Team         ** See Completed Assessments for Annual Wellness Visit within the encounter summary.**         The following assessments were completed:  Living Situation  CAGE  Depression Screening  Timed Get Up and Go  Whisper Test  Cognitive Function Screening  Nutrition Screening  ADL Screening  PAQ Screening        Opioid documentation does not have a current opioid prescription.       Vitals:    24 1507   BP: 110/60   BP Location: Right arm   Patient Position: Sitting   Pulse: 78   Resp: 16   Temp: 98.3 °F (36.8 °C)   TempSrc: Oral   SpO2: 97%   Weight: 61.2 kg (135 lb)   Height: 5' 4.5" (1.638 m)     Body mass index is 22.81 kg/m².  Physical Exam  Vitals and nursing note reviewed.   Constitutional:       General: She is not in acute distress.     Appearance: Normal appearance.   HENT:      Head: Normocephalic.      Right Ear: Tympanic membrane, ear canal and external ear normal.      Left Ear: Tympanic membrane, ear canal and external ear normal.      Nose: Nose normal.      Mouth/Throat:      Mouth: Mucous membranes are moist.      Pharynx: Oropharynx is clear.   Eyes:      Conjunctiva/sclera: Conjunctivae normal.      Pupils: Pupils are equal, round, and reactive to light.   Neck:      Thyroid: No thyroid mass or thyromegaly.      Vascular: Normal carotid pulses. No carotid bruit.   Cardiovascular:      Rate and Rhythm: Normal rate and regular rhythm.      Pulses: Normal pulses.      Heart sounds: Normal heart sounds.   Pulmonary:      Effort: Pulmonary effort " is normal. No respiratory distress.      Breath sounds: Wheezing present. No rhonchi or rales.   Abdominal:      Palpations: Abdomen is soft.      Tenderness: There is no abdominal tenderness.   Musculoskeletal:      Cervical back: Neck supple.      Right lower leg: No edema.      Left lower leg: No edema.   Lymphadenopathy:      Cervical: No cervical adenopathy.   Skin:     General: Skin is warm and dry.      Coloration: Skin is not jaundiced or pale.   Neurological:      General: No focal deficit present.      Mental Status: She is alert and oriented to person, place, and time.   Psychiatric:         Mood and Affect: Mood normal.         Behavior: Behavior normal.             Diagnoses and health risks identified today and associated recommendations/orders:  1. Encounter for subsequent annual wellness visit (AWV) in Medicare patient    2. Other hyperlipidemia  Assessment & Plan:  Lab Results   Component Value Date    CHOL 216 (H) 04/24/2024    CHOL 220 (H) 11/08/2022     Lab Results   Component Value Date    HDL 60 04/24/2024    HDL 59 11/08/2022     Lab Results   Component Value Date    LDLCALC 132 04/24/2024    LDLCALC 141 11/08/2022     Lab Results   Component Value Date    TRIG 120 04/24/2024    TRIG 99 11/08/2022       Lab Results   Component Value Date    CHOLHDL 3.6 04/24/2024    CHOLHDL 3.7 11/08/2022     Refuses statin.      3. Prediabetes  Overview:  8/7/18 A1c 6.5%, didn't take med; Nov 2018 6.1%; Dec 2020 6.0%    Assessment & Plan:  Lab Results   Component Value Date    HGBA1C 6.2 04/24/2024     Making diet changes since staying hormone specialist and has lost 5 lbs.    Orders:  -     Microalbumin/Creatinine Ratio, Urine; Future; Expected date: 06/19/2024    4. Chronic kidney disease, stage 3b  Overview:  GFR 44 at 1st visit 8/1/18    Assessment & Plan:  BMP  Lab Results   Component Value Date     04/24/2024    K 4.2 04/24/2024     (H) 04/24/2024    CO2 27 04/24/2024    BUN 23 (H)  04/24/2024    CREATININE 1.21 (H) 04/24/2024    CALCIUM 10.0 04/24/2024    ANIONGAP 10 04/24/2024    EGFRNORACEVR 47 (L) 04/24/2024     Refuses ACEI/ARB or other treatment.  Has been counseled to avoid nephrotoxic agents including NSAIDs.    Orders:  -     Microalbumin/Creatinine Ratio, Urine; Future; Expected date: 06/19/2024    5. Gastroesophageal reflux disease without esophagitis  Assessment & Plan:  Trying to wean use of omeprazole per advice of hormone specialist Dr. Judi Jean-Baptiste of [place], [place].    Has changed her diet and is getting more protein and avoiding liquids for 1 hour before and after eating.      6. Post-menopausal  Assessment & Plan:  Schedule DEXA bone density evaluation.    Orders:  -     DXA Bone Density Axial Skeleton 1 or more sites; Future; Expected date: 06/19/2024    7. BMI 22.0-22.9, adult         Provided Lynne with a 5-10 year written screening schedule and personal prevention plan. Recommendations were developed using the USPSTF age appropriate recommendations. Education, counseling, and referrals were provided as needed. After Visit Summary printed and given to patient which includes a list of additional screenings\tests needed.      Follow up in about 1 year (around 6/19/2025) for Medicare AWV and 6-mth for routine prediabetes, CKD.    Signature:  SHANNAN Mane-BC    Future Appointments   Date Time Provider Department Center   7/30/2024  2:00 PM [facility] DEXA1 [facility]   7/30/2024  3:00 PM [facility] MAMMO1 OB [facility]   12/11/2024  3:00 PM Kristyn Tavera FNP [facility]   6/25/2025 10:00 AM AWV NURSE, [facility] FAMILY MEDICINE [facility]       Covid vaccine - declined, Pneumonia vaccine - declined, Tetanus vaccine - declines, Shingles vaccine - declined, RSV vaccine - declined with VIS (10/19/2023) given with instructions to take at pharmacy, Mammogram - scheduled 7/30/24, BMD - ordered and  scheduled this visit, Urine Cr - ordered this visit.    I offered to discuss advanced care planning, including how to pick a person who would make decisions for you if you were unable to make them for yourself, called a health care power of , and what kind of decisions you might make such as use of life sustaining treatments such as ventilators and tube feeding when faced with a life limiting illness recorded on a living will that they will need to know. (How you want to be cared for as you near the end of your natural life)     X  Patient is unwilling to engage in a discussion regarding advance directives at this time.

## 2024-06-19 ENCOUNTER — OFFICE VISIT (OUTPATIENT)
Dept: FAMILY MEDICINE | Facility: CLINIC | Age: 74
End: 2024-06-19
Payer: MEDICARE

## 2024-06-19 VITALS
OXYGEN SATURATION: 97 % | SYSTOLIC BLOOD PRESSURE: 110 MMHG | BODY MASS INDEX: 22.49 KG/M2 | DIASTOLIC BLOOD PRESSURE: 60 MMHG | HEART RATE: 78 BPM | HEIGHT: 65 IN | TEMPERATURE: 98 F | WEIGHT: 135 LBS | RESPIRATION RATE: 16 BRPM

## 2024-06-19 DIAGNOSIS — E78.49 OTHER HYPERLIPIDEMIA: Chronic | ICD-10-CM

## 2024-06-19 DIAGNOSIS — Z78.0 POST-MENOPAUSAL: ICD-10-CM

## 2024-06-19 DIAGNOSIS — N18.32 CHRONIC KIDNEY DISEASE, STAGE 3B: Chronic | ICD-10-CM

## 2024-06-19 DIAGNOSIS — R73.03 PREDIABETES: Chronic | ICD-10-CM

## 2024-06-19 DIAGNOSIS — K21.9 GASTROESOPHAGEAL REFLUX DISEASE WITHOUT ESOPHAGITIS: Chronic | ICD-10-CM

## 2024-06-19 DIAGNOSIS — Z00.00 ENCOUNTER FOR SUBSEQUENT ANNUAL WELLNESS VISIT (AWV) IN MEDICARE PATIENT: Primary | ICD-10-CM

## 2024-06-19 PROBLEM — R14.2 BELCHING: Status: RESOLVED | Noted: 2021-09-27 | Resolved: 2024-06-19

## 2024-06-19 PROBLEM — R13.19 ESOPHAGEAL DYSPHAGIA: Status: RESOLVED | Noted: 2021-09-27 | Resolved: 2024-06-19

## 2024-06-19 PROBLEM — R10.13 EPIGASTRIC PAIN: Status: RESOLVED | Noted: 2021-10-12 | Resolved: 2024-06-19

## 2024-06-19 PROBLEM — R19.06 EPIGASTRIC FULLNESS: Status: RESOLVED | Noted: 2021-09-27 | Resolved: 2024-06-19

## 2024-06-19 LAB
CREAT UR-MCNC: 49 MG/DL (ref 28–219)
MICROALBUMIN UR-MCNC: <0.5 MG/DL (ref 0–2.8)
MICROALBUMIN/CREAT RATIO PNL UR: <10.2 MG/G (ref 0–30)

## 2024-06-19 PROCEDURE — 82043 UR ALBUMIN QUANTITATIVE: CPT | Mod: ,,, | Performed by: CLINICAL MEDICAL LABORATORY

## 2024-06-19 PROCEDURE — 82570 ASSAY OF URINE CREATININE: CPT | Mod: ,,, | Performed by: CLINICAL MEDICAL LABORATORY

## 2024-06-19 PROCEDURE — G0439 PPPS, SUBSEQ VISIT: HCPCS | Mod: ,,, | Performed by: NURSE PRACTITIONER

## 2024-06-19 NOTE — ASSESSMENT & PLAN NOTE
Trying to wean use of omeprazole per advice of hormone specialist Dr. Judi Jean-Baptiste of [place], [place].    Has changed her diet and is getting more protein and avoiding liquids for 1 hour before and after eating.

## 2024-06-19 NOTE — PATIENT INSTRUCTIONS
Counseling and Referral of Other Preventative  (Italic type indicates deductible and co-insurance are waived)    Patient Name: Lynne Grimaldo  Today's Date: 6/19/2024    Health Maintenance       Date Due Completion Date    Pneumococcal Vaccines (Age 65+) (1 of 2 - PCV) Never done ---    RSV Vaccine (Age 60+ and Pregnant patients) (1 - 1-dose 60+ series) Never done ---    Annual UACr 11/08/2023 11/8/2022    DEXA Scan 04/06/2024 4/6/2021    Mammogram 07/25/2024 7/25/2023    Influenza Vaccine (Season Ended) 09/01/2024 9/19/2022 (Declined)    Override on 9/19/2022: Declined    Eye Exam 12/19/2024 12/19/2023    Hemoglobin A1c (Prediabetes) 04/24/2025 4/24/2024    Colorectal Cancer Screening 10/10/2025 10/10/2022    Lipid Panel 04/24/2029 4/24/2024        No orders of the defined types were placed in this encounter.    The following information is provided to all patients.  This information is to help you find resources for any of the problems found today that may be affecting your health:                  Living healthy guide: ms.gov    Understanding Diabetes: www.diabetes.org      Eating healthy: www.cdc.gov/healthyweight      CDC home safety checklist: www.cdc.gov/steadi/patient.html      Agency on Aging: ms.gov    Alcoholics anonymous (AA): www.aa.org      Physical Activity: www.funmilayo.nih.gov/xc7ccsj      Tobacco use: ms.gov

## 2024-06-19 NOTE — ASSESSMENT & PLAN NOTE
BMP  Lab Results   Component Value Date     04/24/2024    K 4.2 04/24/2024     (H) 04/24/2024    CO2 27 04/24/2024    BUN 23 (H) 04/24/2024    CREATININE 1.21 (H) 04/24/2024    CALCIUM 10.0 04/24/2024    ANIONGAP 10 04/24/2024    EGFRNORACEVR 47 (L) 04/24/2024     Refuses ACEI/ARB or other treatment.  Has been counseled to avoid nephrotoxic agents including NSAIDs.

## 2024-06-19 NOTE — ASSESSMENT & PLAN NOTE
Lab Results   Component Value Date    HGBA1C 6.2 04/24/2024     Making diet changes since staying hormone specialist and has lost 5 lbs.

## 2024-06-19 NOTE — ASSESSMENT & PLAN NOTE
Lab Results   Component Value Date    CHOL 216 (H) 04/24/2024    CHOL 220 (H) 11/08/2022     Lab Results   Component Value Date    HDL 60 04/24/2024    HDL 59 11/08/2022     Lab Results   Component Value Date    LDLCALC 132 04/24/2024    LDLCALC 141 11/08/2022     Lab Results   Component Value Date    TRIG 120 04/24/2024    TRIG 99 11/08/2022       Lab Results   Component Value Date    CHOLHDL 3.6 04/24/2024    CHOLHDL 3.7 11/08/2022     Refuses statin.

## 2024-07-11 ENCOUNTER — OFFICE VISIT (OUTPATIENT)
Dept: FAMILY MEDICINE | Facility: CLINIC | Age: 74
End: 2024-07-11
Payer: MEDICARE

## 2024-07-11 VITALS
BODY MASS INDEX: 22.43 KG/M2 | HEIGHT: 65 IN | RESPIRATION RATE: 18 BRPM | HEART RATE: 77 BPM | DIASTOLIC BLOOD PRESSURE: 74 MMHG | OXYGEN SATURATION: 98 % | WEIGHT: 134.63 LBS | TEMPERATURE: 98 F | SYSTOLIC BLOOD PRESSURE: 137 MMHG

## 2024-07-11 DIAGNOSIS — T63.483A: ICD-10-CM

## 2024-07-11 DIAGNOSIS — T63.463A: Primary | ICD-10-CM

## 2024-07-11 PROBLEM — T63.461A YELLOW JACKET STING: Status: ACTIVE | Noted: 2024-07-11

## 2024-07-11 RX ORDER — TRIAMCINOLONE ACETONIDE 1 MG/G
OINTMENT TOPICAL 2 TIMES DAILY
Qty: 454 G | Refills: 0 | Status: SHIPPED | OUTPATIENT
Start: 2024-07-11

## 2024-07-11 RX ORDER — METHYLPREDNISOLONE ACETATE 40 MG/ML
40 INJECTION, SUSPENSION INTRA-ARTICULAR; INTRALESIONAL; INTRAMUSCULAR; SOFT TISSUE
Status: COMPLETED | OUTPATIENT
Start: 2024-07-11 | End: 2024-07-11

## 2024-07-11 RX ORDER — DEXAMETHASONE SODIUM PHOSPHATE 4 MG/ML
6 INJECTION, SOLUTION INTRA-ARTICULAR; INTRALESIONAL; INTRAMUSCULAR; INTRAVENOUS; SOFT TISSUE
Status: COMPLETED | OUTPATIENT
Start: 2024-07-11 | End: 2024-07-11

## 2024-07-11 RX ADMIN — DEXAMETHASONE SODIUM PHOSPHATE 6 MG: 4 INJECTION, SOLUTION INTRA-ARTICULAR; INTRALESIONAL; INTRAMUSCULAR; INTRAVENOUS; SOFT TISSUE at 08:07

## 2024-07-11 RX ADMIN — METHYLPREDNISOLONE ACETATE 40 MG: 40 INJECTION, SUSPENSION INTRA-ARTICULAR; INTRALESIONAL; INTRAMUSCULAR; SOFT TISSUE at 08:07
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
## 2024-07-30 ENCOUNTER — HOSPITAL ENCOUNTER (OUTPATIENT)
Dept: RADIOLOGY | Facility: HOSPITAL | Age: 74
Discharge: HOME | End: 2024-07-30
Attending: NURSE PRACTITIONER
Payer: MEDICARE

## 2024-07-30 VITALS — HEIGHT: 65 IN | WEIGHT: 130 LBS | BODY MASS INDEX: 21.66 KG/M2

## 2024-07-30 DIAGNOSIS — Z12.31 BREAST CANCER SCREENING BY MAMMOGRAM: ICD-10-CM

## 2024-07-30 DIAGNOSIS — Z78.0 POST-MENOPAUSAL: ICD-10-CM

## 2024-07-30 PROCEDURE — 77063 BREAST TOMOSYNTHESIS BI: CPT | Mod: TC

## 2024-07-30 PROCEDURE — 77080 DXA BONE DENSITY AXIAL: CPT | Mod: 26,,, | Performed by: RADIOLOGY

## 2024-07-30 PROCEDURE — 77067 SCR MAMMO BI INCL CAD: CPT | Mod: TC

## 2024-07-30 PROCEDURE — 77080 DXA BONE DENSITY AXIAL: CPT | Mod: TC

## 2024-07-31 NOTE — PROGRESS NOTES
Normal bone density.  Per Medicare recommendations, repeat in 3 years for preventive monitoring in postmenopausal women.

## 2024-08-09 DIAGNOSIS — Z71.89 COMPLEX CARE COORDINATION: ICD-10-CM

## 2024-08-17 ENCOUNTER — OFFICE VISIT (OUTPATIENT)
Dept: FAMILY MEDICINE | Facility: CLINIC | Age: 74
End: 2024-08-17
Payer: MEDICARE

## 2024-08-17 VITALS
SYSTOLIC BLOOD PRESSURE: 124 MMHG | TEMPERATURE: 98 F | DIASTOLIC BLOOD PRESSURE: 62 MMHG | HEIGHT: 65 IN | RESPIRATION RATE: 20 BRPM | HEART RATE: 79 BPM | WEIGHT: 130 LBS | OXYGEN SATURATION: 98 % | BODY MASS INDEX: 21.66 KG/M2

## 2024-08-17 DIAGNOSIS — R30.0 DYSURIA: ICD-10-CM

## 2024-08-17 DIAGNOSIS — N30.00 ACUTE CYSTITIS WITHOUT HEMATURIA: Primary | ICD-10-CM

## 2024-08-17 LAB
BACTERIA #/AREA URNS HPF: ABNORMAL /HPF
BILIRUB SERPL-MCNC: NEGATIVE MG/DL
BILIRUB UR QL STRIP: NEGATIVE
BLOOD URINE, POC: NORMAL
CLARITY UR: CLEAR
CLARITY, POC UA: CLEAR
COLOR UR: ABNORMAL
COLOR, POC UA: YELLOW
GLUCOSE UR QL STRIP: NEGATIVE
GLUCOSE UR STRIP-MCNC: NORMAL MG/DL
KETONES UR QL STRIP: NEGATIVE
KETONES UR STRIP-SCNC: NEGATIVE MG/DL
LEUKOCYTE ESTERASE UR QL STRIP: ABNORMAL
LEUKOCYTE ESTERASE URINE, POC: NORMAL
MUCOUS, UA: ABNORMAL /LPF
NITRITE UR QL STRIP: NEGATIVE
NITRITE, POC UA: NEGATIVE
PH UR STRIP: 6.5 PH UNITS
PH, POC UA: 6.5
PROT UR QL STRIP: NEGATIVE
PROTEIN, POC: NEGATIVE
RBC # UR STRIP: NEGATIVE /UL
RBC #/AREA URNS HPF: 1 /HPF
SP GR UR STRIP: 1.02
SPECIFIC GRAVITY, POC UA: 1.02
SQUAMOUS #/AREA URNS LPF: ABNORMAL /HPF
UROBILINOGEN UR STRIP-ACNC: NORMAL MG/DL
UROBILINOGEN, POC UA: 0.2
WBC #/AREA URNS HPF: 14 /HPF

## 2024-08-17 PROCEDURE — 87086 URINE CULTURE/COLONY COUNT: CPT | Mod: ,,, | Performed by: CLINICAL MEDICAL LABORATORY

## 2024-08-17 PROCEDURE — 99213 OFFICE O/P EST LOW 20 MIN: CPT | Mod: ,,, | Performed by: NURSE PRACTITIONER

## 2024-08-17 PROCEDURE — 96372 THER/PROPH/DIAG INJ SC/IM: CPT | Mod: ,,, | Performed by: NURSE PRACTITIONER

## 2024-08-17 PROCEDURE — 87077 CULTURE AEROBIC IDENTIFY: CPT | Mod: ,,, | Performed by: CLINICAL MEDICAL LABORATORY

## 2024-08-17 PROCEDURE — 81003 URINALYSIS AUTO W/O SCOPE: CPT | Mod: RHCUB | Performed by: NURSE PRACTITIONER

## 2024-08-17 PROCEDURE — 81001 URINALYSIS AUTO W/SCOPE: CPT | Mod: ,,, | Performed by: CLINICAL MEDICAL LABORATORY

## 2024-08-17 PROCEDURE — 87186 SC STD MICRODIL/AGAR DIL: CPT | Mod: ,,, | Performed by: CLINICAL MEDICAL LABORATORY

## 2024-08-17 NOTE — PROGRESS NOTES
"Subjective:       Patient ID: Lynne Grimaldo is a 73 y.o. female.    Vitals:  height is 5' 4.5" (1.638 m) and weight is 59 kg (130 lb). Her oral temperature is 97.9 °F (36.6 °C). Her blood pressure is 124/62 and her pulse is 79. Her respiration is 20 and oxygen saturation is 98%.     Chief Complaint: Urinary Tract Infection (Pt presents to the clinic for uti symptoms frequent bathroom stops started yesterday and it burns bad)    Urinary Tract Infection   This is a new problem. The current episode started yesterday. The problem occurs every urination. The problem has been waxing and waning. The quality of the pain is described as burning. There has been no fever. Associated symptoms include frequency, urgency and withholding. Pertinent negatives include no discharge, flank pain, hematuria, hesitancy, nausea or vomiting. She has tried nothing for the symptoms. Her past medical history is significant for recurrent UTIs (has not had a UTI in about 2-3 years).       Constitution: Negative for fever.   HENT:  Positive for congestion, postnasal drip and sinus pressure.    Gastrointestinal:  Negative for abdominal pain, nausea and vomiting.   Genitourinary:  Positive for dysuria, frequency and urgency. Negative for flank pain and hematuria.         Objective:      Physical Exam  Vitals reviewed.   Constitutional:       Appearance: Normal appearance.   HENT:      Mouth/Throat:      Mouth: Mucous membranes are moist.   Eyes:      Conjunctiva/sclera: Conjunctivae normal.   Cardiovascular:      Rate and Rhythm: Normal rate and regular rhythm.      Heart sounds: No murmur heard.  Pulmonary:      Effort: Pulmonary effort is normal.      Breath sounds: Normal breath sounds.   Abdominal:      Tenderness: There is abdominal tenderness in the suprapubic area.   Neurological:      Mental Status: She is alert and oriented to person, place, and time.      Gait: Gait normal.   Psychiatric:         Mood and Affect: Mood normal.       "   Behavior: Behavior normal.         Thought Content: Thought content normal.         Judgment: Judgment normal.              Assessment:       1. Acute cystitis without hematuria    2. Dysuria          Recent Results (from the past 24 hour(s))   POCT URINALYSIS W/O SCOPE    Collection Time: 08/17/24  5:26 PM   Result Value Ref Range    Color, UA Yellow     Clarity, UA Clear     Spec Grav UA 1.020     pH, UA 6.5     WBC, UA trace     Nitrite, UA negative     Protein, POC negative     Glucose, UA negative     Ketones, UA negative     Bilirubin, POC negative     Urobilinogen, UA 0.2     Blood, UA trace-intact    Urinalysis, Reflex to Urine Culture    Collection Time: 08/17/24  5:34 PM    Specimen: Urine   Result Value Ref Range    Color, UA Light-Yellow Colorless, Straw, Yellow, Light Yellow, Dark Yellow    Clarity, UA Clear Clear    pH, UA 6.5 5.0 to 8.0 pH Units    Leukocytes, UA Large (A) Negative    Nitrites, UA Negative Negative    Protein, UA Negative Negative    Glucose, UA Normal Normal mg/dL    Ketones, UA Negative Negative mg/dL    Urobilinogen, UA Normal 0.2, 1.0, Normal mg/dL    Bilirubin, UA Negative Negative    Blood, UA Negative Negative    Specific Gravity, UA 1.016 <=1.030   Urinalysis, Microscopic    Collection Time: 08/17/24  5:34 PM   Result Value Ref Range    WBC, UA 14 (H) <=5 /hpf    RBC, UA 1 <=3 /hpf    Bacteria, UA Moderate (A) None Seen /hpf    Squamous Epithelial Cells, UA Occasional (A) None Seen /HPF    Mucous Occasional (A) None Seen /LPF        Plan:         Acute cystitis without hematuria  -     nitrofurantoin, macrocrystal-monohydrate, (MACROBID) 100 MG capsule; Take 1 capsule (100 mg total) by mouth 2 (two) times daily.  Dispense: 10 capsule; Refill: 0    Dysuria  -     POCT URINALYSIS W/O SCOPE  -     Urinalysis, Reflex to Urine Culture; Future; Expected date: 08/17/2024  -     cefTRIAXone (Rocephin) 1 g in LIDOcaine HCL 10 mg/ml (1%) 4 mL IM only syringe  -     Urinalysis,  Microscopic  -     Urine culture        Follow up if symptoms worsen or fail to improve.     Future Appointments   Date Time Provider Department Center   12/11/2024  3:00 PM Kristyn Tavera FNP [facility]   6/25/2025 10:00 AM AWRHODA NURSE, [facility] FAMILY MEDICINE [facility]   8/5/2025  2:40 PM [facility] MAMMO1 OB MMIC [facility]        An After Visit Summary was printed and given to the patient.     Signature:    SHANNAN Santa  Family Nurse Practitioner  [facility]    Date of encounter: 8/17/24

## 2024-08-18 RX ORDER — NITROFURANTOIN 25; 75 MG/1; MG/1
100 CAPSULE ORAL 2 TIMES DAILY
Qty: 10 CAPSULE | Refills: 0 | Status: SHIPPED | OUTPATIENT
Start: 2024-08-18

## 2024-08-20 LAB — UA COMPLETE W REFLEX CULTURE PNL UR: ABNORMAL

## 2024-11-22 ENCOUNTER — PATIENT MESSAGE (OUTPATIENT)
Dept: FAMILY MEDICINE | Facility: CLINIC | Age: 74
End: 2024-11-22
Payer: MEDICARE

## 2024-12-03 PROCEDURE — 85384 FIBRINOGEN ACTIVITY: CPT | Performed by: FAMILY MEDICINE

## 2024-12-11 ENCOUNTER — OFFICE VISIT (OUTPATIENT)
Dept: FAMILY MEDICINE | Facility: CLINIC | Age: 74
End: 2024-12-11
Payer: MEDICARE

## 2024-12-11 VITALS
BODY MASS INDEX: 22.63 KG/M2 | WEIGHT: 135.81 LBS | RESPIRATION RATE: 18 BRPM | OXYGEN SATURATION: 95 % | HEART RATE: 73 BPM | SYSTOLIC BLOOD PRESSURE: 119 MMHG | HEIGHT: 65 IN | DIASTOLIC BLOOD PRESSURE: 70 MMHG | TEMPERATURE: 98 F

## 2024-12-11 DIAGNOSIS — N18.32 CHRONIC KIDNEY DISEASE, STAGE 3B: Primary | Chronic | ICD-10-CM

## 2024-12-11 DIAGNOSIS — J45.991 COUGH VARIANT ASTHMA: Chronic | ICD-10-CM

## 2024-12-11 DIAGNOSIS — J31.0 CHRONIC RHINITIS: Chronic | ICD-10-CM

## 2024-12-11 DIAGNOSIS — J01.90 ACUTE NON-RECURRENT SINUSITIS, UNSPECIFIED LOCATION: ICD-10-CM

## 2024-12-11 DIAGNOSIS — R73.03 PREDIABETES: Chronic | ICD-10-CM

## 2024-12-11 PROBLEM — T63.461A YELLOW JACKET STING: Status: RESOLVED | Noted: 2024-07-11 | Resolved: 2024-12-11

## 2024-12-11 PROCEDURE — 96372 THER/PROPH/DIAG INJ SC/IM: CPT | Mod: ,,, | Performed by: NURSE PRACTITIONER

## 2024-12-11 PROCEDURE — 99214 OFFICE O/P EST MOD 30 MIN: CPT | Mod: ,,, | Performed by: NURSE PRACTITIONER

## 2024-12-11 RX ORDER — METHYLPREDNISOLONE ACETATE 40 MG/ML
40 INJECTION, SUSPENSION INTRA-ARTICULAR; INTRALESIONAL; INTRAMUSCULAR; SOFT TISSUE
Status: COMPLETED | OUTPATIENT
Start: 2024-12-11 | End: 2024-12-11

## 2024-12-11 RX ORDER — DEXAMETHASONE SODIUM PHOSPHATE 4 MG/ML
4 INJECTION, SOLUTION INTRA-ARTICULAR; INTRALESIONAL; INTRAMUSCULAR; INTRAVENOUS; SOFT TISSUE
Status: COMPLETED | OUTPATIENT
Start: 2024-12-11 | End: 2024-12-11

## 2024-12-11 RX ADMIN — DEXAMETHASONE SODIUM PHOSPHATE 4 MG: 4 INJECTION, SOLUTION INTRA-ARTICULAR; INTRALESIONAL; INTRAMUSCULAR; INTRAVENOUS; SOFT TISSUE at 03:12

## 2024-12-11 RX ADMIN — METHYLPREDNISOLONE ACETATE 40 MG: 40 INJECTION, SUSPENSION INTRA-ARTICULAR; INTRALESIONAL; INTRAMUSCULAR; SOFT TISSUE at 03:12

## 2024-12-11 NOTE — PROGRESS NOTES
[facility]  [address] [place] [address]  Phone: [phone]  Fax: [phone]       PATIENT NAME: Lynne Grimaldo   : 1950    AGE: 74 y.o. DATE OF ENCOUNTER: 24    MRN: [id number]      PCP: Kristyn Tavera FNP    Subjective:     Reason for Visit / Chief Complaint:     274}  Chief Complaint   Patient presents with    Prediabetes     Patient reports to the clinic today for her 6 month follow up.    Chronic Kidney Disease    Health Maintenance     Care gaps addressed, patient has her eye exam next month, declines all vaccines at this time.    Karley Rao CMA     History of Present Illness    6 mth f/u prediabetes & CKD    HPI:  Patient reports a sinus infection that began approximately 1 week ago with head congestion, fatigue, and dizziness. The condition has improved, but she still has some head congestion, though less severe than initially. She has a mild cough, which is less pronounced than her usual chronic cough, and some chills without fever.    The sinus symptoms have progressed from green, thick mucus to clear but still thick mucus. She has been using a sinus rinse as recommended by Dr. Judi Jean-Baptiste, initially twice daily, now only at night as instructed.    Patient had a recent follow-up with Dr. Judi Jean-Baptiste, where lab work was completed. Her A1C level was 5.9, an improvement, and her kidney filtration rate had also improved. Dr. Jean-Baptiste was pleased with her progress, noting these improvements were achieved without medication.    Dr. Jean-Baptiste has recently initiated hormone therapy using a compounded hormone cream. Initially, the patient was instructed to apply it twice daily, but due to apprehension about the treatment, the frequency was reduced to daily.    Patient denies wheezing, shortness of breath, chest pain, or palpitations.      ROS:  Constitutional: -fevers, +chills, +fatigue, +dizziness  ENT: +nasal congestion  Respiratory: -shortness of  breath, +cough, -wheezing  Cardiovascular: -chest pain, -palpitations         Allergies and Meds: 274}     Review of patient's allergies indicates:   Allergen Reactions    Sulfa (sulfonamide antibiotics) Hives and Edema        Current Outpatient Medications   Medication Sig Dispense Refill    aspirin 81 MG Chew Take 81 mg by mouth once daily.      azelastine (ASTELIN) 137 mcg (0.1 %) nasal spray 2 sprays 2 (two) times daily.      calcium citrate (CALCITRATE) 200 mg (950 mg) tablet Take 1 tablet by mouth once daily.      MAGNESIUM GLYCINATE ORAL Take 1,000 mg by mouth nightly.      multivitamin capsule Take 2 capsules by mouth once daily. Elver Multivitamin      polyethylene glycol (GLYCOLAX) 17 gram/dose powder Take 17 g by mouth every other day.      soybean, fermented (NATTOKINASE ORAL) Take 1 tablet by mouth Daily. Nattorina per Dr. Judi Jean-Baptiste       No current facility-administered medications for this visit.       Labs:274}   I have reviewed labs below:    Lab Results   Component Value Date    WBC 6.40 12/03/2024    RBC 5.08 12/03/2024    HGB 14.6 12/03/2024    HCT 46.3 12/03/2024     12/03/2024     12/03/2024    K 4.4 12/03/2024     12/03/2024    CALCIUM 9.8 12/03/2024     12/03/2024    BUN 28 (H) 12/03/2024    CREATININE 1.35 (H) 12/03/2024    EGFRNONAA 37 (L) 10/12/2021    ALT 19 12/03/2024    AST 30 12/03/2024    CHOL 216 (H) 04/24/2024    TRIG 120 04/24/2024    HDL 60 04/24/2024    LDLCALC 132 04/24/2024    TSH 1.450 04/24/2024    HGBA1C 5.9 12/03/2024    MICROALBUR <0.5 06/19/2024       Medical History: 274}     Past Medical History:   Diagnosis Date    Back pain     Chronic kidney disease, stage 3b 08/01/2018    GFR 44 at 1st visit 8/1/18    Cough variant asthma     COVID-19 07/2021    Diabetic eye exam 12/19/2023    Dr. SOPHIA Ledezma MD - [facility]    Expressive language disorder 04/05/2021    GERD (gastroesophageal reflux disease)     Hypercholesteremia  "09/11/2018    Prediabetes 08/07/2018 8/7/18 A1c 6.5%, didn't take med; Nov 2018 6.1%; Dec 2020 6.0%    Stroke     TIA (transient ischemic attack) 04/05/2021      Social History     Tobacco Use   Smoking Status Never    Passive exposure: Past   Smokeless Tobacco Never      Past Surgical History:   Procedure Laterality Date    APPENDECTOMY      BACK SURGERY      ruptured disc        Health Maintenance:      Health Maintenance Topics with due status: Not Due       Topic Last Completion Date    Colorectal Cancer Screening 10/10/2022    Lipid Panel 04/24/2024    Annual UACr 06/19/2024    Mammogram 07/30/2024    DEXA Scan 07/30/2024    Hemoglobin A1c (Prediabetes) 12/03/2024    RSV Vaccine (Age 60+ and Pregnant patients) Not Due       Objective:  274}   Vital Signs  Temp: 98.1 °F (36.7 °C)  Temp Source: Oral  Pulse: 73  Resp: 18  SpO2: 95 %  BP: 119/70  BP Location: Left arm  Patient Position: Sitting  Pain Score: 0-No pain  Height and Weight  Height: 5' 4.5" (163.8 cm)  Weight: 61.6 kg (135 lb 12.8 oz)  BSA (Calculated - sq m): 1.67 sq meters  BMI (Calculated): 23  Weight in (lb) to have BMI = 25: 147.6    Over the last two weeks how often have you been bothered by little interest or pleasure in doing things: 0  Over the last two weeks how often have you been bothered by feeling down, depressed or hopeless: 0  PHQ-2 Total Score: 0  PHQ-9 Score: 0  PHQ-9 Interpretation: Minimal or None    Wt Readings from Last 3 Encounters:   12/11/24 61.6 kg (135 lb 12.8 oz)   08/17/24 59 kg (130 lb)   07/30/24 59 kg (130 lb)     Physical Exam  Vitals and nursing note reviewed.   Constitutional:       General: She is not in acute distress.     Appearance: Normal appearance. She is not ill-appearing.   HENT:      Head: Normocephalic.      Right Ear: Tympanic membrane, ear canal and external ear normal.      Left Ear: Tympanic membrane, ear canal and external ear normal.      Nose: Congestion present. No rhinorrhea.      Right Sinus: No " maxillary sinus tenderness or frontal sinus tenderness.      Left Sinus: No maxillary sinus tenderness or frontal sinus tenderness.      Mouth/Throat:      Mouth: Mucous membranes are moist.      Pharynx: Oropharynx is clear. Uvula midline. No uvula swelling.   Eyes:      Conjunctiva/sclera: Conjunctivae normal.   Neck:      Trachea: Trachea normal.   Cardiovascular:      Rate and Rhythm: Normal rate and regular rhythm.      Pulses: Normal pulses.      Heart sounds: Normal heart sounds.   Pulmonary:      Effort: Pulmonary effort is normal. No respiratory distress.      Breath sounds: Normal breath sounds. No wheezing, rhonchi or rales.   Musculoskeletal:      Cervical back: Neck supple.      Right lower leg: No edema.      Left lower leg: No edema.   Lymphadenopathy:      Cervical: No cervical adenopathy.   Skin:     General: Skin is warm and dry.      Coloration: Skin is not jaundiced or pale.   Neurological:      General: No focal deficit present.      Mental Status: She is alert and oriented to person, place, and time.      Gait: Gait normal.   Psychiatric:         Mood and Affect: Mood normal.         Behavior: Behavior normal.          Assessment and Plan: 274}       1. Chronic kidney disease, stage 3b  Overview:  GFR 44 at 1st visit 8/1/18    Assessment & Plan:  BMP  Lab Results   Component Value Date     12/03/2024    K 4.4 12/03/2024     12/03/2024    CO2 27 12/03/2024    BUN 28 (H) 12/03/2024    CREATININE 1.35 (H) 12/03/2024    CALCIUM 9.8 12/03/2024    ANIONGAP 13 12/03/2024    EGFRNORACEVR 41 (L) 12/03/2024     Stable continue monitoring.  Refuses ACEI/ARB or other treatment.  Has been counseled to avoid nephrotoxic agents including NSAIDs.      2. Prediabetes  Overview:  8/7/18 A1c 6.5%, didn't take med; Nov 2018 6.1%; Dec 2020 6.0%    Assessment & Plan:  Lab Results   Component Value Date    HGBA1C 5.9 12/03/2024    HGBA1C 6.2 04/24/2024    HGBA1C 6.2 11/08/2022     A1c improving with  lifestyle changes.      3. Acute non-recurrent sinusitis, unspecified location  -     methylPREDNISolone acetate injection 40 mg  -     dexAMETHasone injection 4 mg    4. Chronic rhinitis  Assessment & Plan:  Stable  Continue sinus rinse and Astelin.      5. Cough variant asthma        Assessment & Plan    IMPRESSION:  - Assessed patient's sinus symptoms and determined steroid injection appropriate for symptom relief  - Reviewed recent lab results from Dr. Judi Jean-Baptiste, noting improved A1C (5.9) and kidney function  - Considered patient's chronic cough, but noted current symptoms primarily sinus-related  - Evaluated need for antibiotic, deemed unnecessary based on symptom improvement    SINUS INFECTION:  - Administered steroid injection (decadron depo-medrol combination) for sinus symptom relief.  - Continued Astelin nasal spray for sinus symptoms.  - Noted that the patient's symptoms are improving and does not appear to need antibiotics at this time.  - Advised the patient to continue using sinus rinse at night as recommended by another doctor.    ENT REFERRAL:  - Considering seeing ENT specialist due to recurrent allergy and sinus problems.    CHRONIC KIDNEY DISEASE:  - Reviewed recent lab work for chronic kidney disease follow-up.  - Noted that the patient's filtration rate on kidneys has improved, but still in stage 3 kidney disease range.  - Recommend increased water intake for kidney health.    PREDIABETES:  - Reviewed recent lab work for prediabetes follow-up.  - Noted that the patient's A1C has improved to 5.9 without medication.  - Acknowledged the patient's efforts in controlling prediabetes without medication.  - Reinforced dietary advice regarding water intake with meals.  - Patient to increase water intake.    FOLLOW UP:  - Follow up on June 25th as scheduled for Medicare visit.         Signature:  SHANNAN Mane-BC    Future Appointments   Date Time Provider Department Center   6/25/2025  10:00 AM AWV NURSE, [facility] [place]   8/5/2025  2:40 PM [facility] [place] [facility]     This note was generated with the assistance of ambient listening technology. Verbal consent was obtained by the patient and accompanying visitor(s) for the recording of patient appointment to facilitate this note. I attest to having reviewed and edited the generated note for accuracy, though some syntax or spelling errors may persist. Please contact the author of this note for any clarification.

## 2024-12-12 NOTE — ASSESSMENT & PLAN NOTE
Lab Results   Component Value Date    HGBA1C 5.9 12/03/2024    HGBA1C 6.2 04/24/2024    HGBA1C 6.2 11/08/2022     A1c improving with lifestyle changes.

## 2025-05-20 PROCEDURE — 85384 FIBRINOGEN ACTIVITY: CPT | Performed by: FAMILY MEDICINE

## 2025-06-08 ENCOUNTER — OFFICE VISIT (OUTPATIENT)
Dept: FAMILY MEDICINE | Facility: CLINIC | Age: 75
End: 2025-06-08
Payer: MEDICARE

## 2025-06-08 VITALS
HEART RATE: 85 BPM | SYSTOLIC BLOOD PRESSURE: 118 MMHG | BODY MASS INDEX: 23.32 KG/M2 | OXYGEN SATURATION: 97 % | WEIGHT: 138 LBS | RESPIRATION RATE: 20 BRPM | DIASTOLIC BLOOD PRESSURE: 62 MMHG | TEMPERATURE: 98 F

## 2025-06-08 DIAGNOSIS — J32.9 SINUSITIS, UNSPECIFIED CHRONICITY, UNSPECIFIED LOCATION: Primary | ICD-10-CM

## 2025-06-08 DIAGNOSIS — J02.9 SORE THROAT: ICD-10-CM

## 2025-06-08 LAB
CTP QC/QA: YES
MOLECULAR STREP A: NEGATIVE

## 2025-06-08 PROCEDURE — 96372 THER/PROPH/DIAG INJ SC/IM: CPT | Mod: ,,, | Performed by: FAMILY MEDICINE

## 2025-06-08 PROCEDURE — 99213 OFFICE O/P EST LOW 20 MIN: CPT | Mod: ,,, | Performed by: FAMILY MEDICINE

## 2025-06-08 PROCEDURE — 87651 STREP A DNA AMP PROBE: CPT | Mod: RHCUB | Performed by: FAMILY MEDICINE

## 2025-06-08 RX ORDER — PROMETHAZINE HYDROCHLORIDE AND DEXTROMETHORPHAN HYDROBROMIDE 6.25; 15 MG/5ML; MG/5ML
5 SYRUP ORAL EVERY 4 HOURS PRN
Qty: 118 ML | Refills: 0 | Status: SHIPPED | OUTPATIENT
Start: 2025-06-08

## 2025-06-08 RX ORDER — PREDNISONE 20 MG/1
20 TABLET ORAL DAILY
Qty: 5 TABLET | Refills: 0 | Status: SHIPPED | OUTPATIENT
Start: 2025-06-08 | End: 2025-06-13

## 2025-06-08 RX ORDER — AZITHROMYCIN 250 MG/1
TABLET, FILM COATED ORAL
Qty: 6 TABLET | Refills: 0 | Status: SHIPPED | OUTPATIENT
Start: 2025-06-08

## 2025-06-08 RX ORDER — DEXAMETHASONE SODIUM PHOSPHATE 4 MG/ML
4 INJECTION, SOLUTION INTRA-ARTICULAR; INTRALESIONAL; INTRAMUSCULAR; INTRAVENOUS; SOFT TISSUE
Status: COMPLETED | OUTPATIENT
Start: 2025-06-08 | End: 2025-06-08

## 2025-06-08 RX ADMIN — DEXAMETHASONE SODIUM PHOSPHATE 4 MG: 4 INJECTION, SOLUTION INTRA-ARTICULAR; INTRALESIONAL; INTRAMUSCULAR; INTRAVENOUS; SOFT TISSUE at 03:06

## 2025-06-08 NOTE — PROGRESS NOTES
Subjective:       Patient ID: Lynne Grimaldo is a 74 y.o. female.    Chief Complaint: Cough, Sore Throat, Headache, Sinus Problem, Ear Fullness, and Nasal Congestion    Cough  Associated symptoms include headaches, postnasal drip, rhinorrhea and a sore throat. Pertinent negatives include no chest pain, chills, ear pain, fever, myalgias, rash, shortness of breath or wheezing. There is no history of environmental allergies.   Sore Throat   Associated symptoms include congestion, coughing, headaches and a plugged ear sensation. Pertinent negatives include no abdominal pain, diarrhea, drooling, ear discharge, ear pain, neck pain, shortness of breath, stridor, trouble swallowing or vomiting.   Headache   Associated symptoms include coughing, rhinorrhea, sinus pressure and a sore throat. Pertinent negatives include no abdominal pain, back pain, dizziness, ear pain, fever, hearing loss, nausea, neck pain, numbness, photophobia, seizures, tinnitus, vomiting or weakness.   Sinus Problem  Associated symptoms include congestion, coughing, headaches, sinus pressure and a sore throat. Pertinent negatives include no chills, diaphoresis, ear pain, neck pain, shortness of breath or sneezing.   Ear Fullness   Associated symptoms include coughing, headaches, rhinorrhea and a sore throat. Pertinent negatives include no abdominal pain, diarrhea, ear discharge, hearing loss, neck pain, rash or vomiting.     Review of Systems   Constitutional:  Negative for activity change, appetite change, chills, diaphoresis, fatigue, fever and unexpected weight change.   HENT:  Positive for nasal congestion, postnasal drip, rhinorrhea, sinus pressure/congestion and sore throat. Negative for dental problem, drooling, ear discharge, ear pain, facial swelling, hearing loss, mouth sores, nosebleeds, sneezing, tinnitus, trouble swallowing, voice change and goiter.    Eyes:  Negative for photophobia, discharge, itching and visual disturbance.    Respiratory:  Positive for cough. Negative for apnea, choking, chest tightness, shortness of breath, wheezing and stridor.    Cardiovascular:  Negative for chest pain, palpitations, leg swelling and claudication.   Gastrointestinal:  Negative for abdominal distention, abdominal pain, anal bleeding, blood in stool, change in bowel habit, constipation, diarrhea, nausea and vomiting.   Endocrine: Negative for cold intolerance, heat intolerance, polydipsia, polyphagia and polyuria.   Genitourinary:  Negative for bladder incontinence, decreased urine volume, difficulty urinating, dysuria, enuresis, flank pain, frequency, hematuria, nocturia, pelvic pain and urgency.   Musculoskeletal:  Negative for arthralgias, back pain, gait problem, joint swelling, leg pain, myalgias, neck pain, neck stiffness and joint deformity.   Integumentary:  Negative for pallor, rash, wound, breast mass and breast tenderness.   Allergic/Immunologic: Negative for environmental allergies, food allergies and immunocompromised state.   Neurological:  Positive for headaches. Negative for dizziness, vertigo, tremors, seizures, syncope, facial asymmetry, speech difficulty, weakness, light-headedness, numbness, coordination difficulties and memory loss.   Hematological:  Negative for adenopathy. Does not bruise/bleed easily.   Psychiatric/Behavioral:  Negative for agitation, behavioral problems, confusion, decreased concentration, dysphoric mood, hallucinations, self-injury, sleep disturbance and suicidal ideas. The patient is not nervous/anxious and is not hyperactive.    Breast: Negative for mass and tenderness        Objective:      Physical Exam  Vitals reviewed.   Constitutional:       Appearance: Normal appearance.   HENT:      Head: Normocephalic and atraumatic.      Right Ear: Tympanic membrane, ear canal and external ear normal.      Left Ear: Tympanic membrane, ear canal and external ear normal.      Nose: Congestion and rhinorrhea present.       Mouth/Throat:      Mouth: Mucous membranes are moist.      Pharynx: Oropharynx is clear. Posterior oropharyngeal erythema present.   Eyes:      Extraocular Movements: Extraocular movements intact.      Conjunctiva/sclera: Conjunctivae normal.      Pupils: Pupils are equal, round, and reactive to light.   Cardiovascular:      Rate and Rhythm: Normal rate and regular rhythm.      Pulses: Normal pulses.      Heart sounds: Normal heart sounds.   Pulmonary:      Effort: Pulmonary effort is normal.      Breath sounds: Normal breath sounds.   Abdominal:      General: Bowel sounds are normal.      Palpations: Abdomen is soft.   Musculoskeletal:         General: Normal range of motion.      Cervical back: Normal range of motion and neck supple.   Skin:     General: Skin is warm and dry.   Neurological:      General: No focal deficit present.      Mental Status: She is alert. Mental status is at baseline.   Psychiatric:         Mood and Affect: Mood normal.         Behavior: Behavior normal.         Thought Content: Thought content normal.         Judgment: Judgment normal.         Assessment:       1. Sinusitis, unspecified chronicity, unspecified location    2. Sore throat        Plan:     Sinusitis, unspecified chronicity, unspecified location  -     dexAMETHasone injection 4 mg  -     predniSONE (DELTASONE) 20 MG tablet; Take 1 tablet (20 mg total) by mouth once daily. for 5 days  Dispense: 5 tablet; Refill: 0  -     promethazine-dextromethorphan (PROMETHAZINE-DM) 6.25-15 mg/5 mL Syrp; Take 5 mLs by mouth every 4 (four) hours as needed.  Dispense: 118 mL; Refill: 0  -     azithromycin (Z-AUGUSTINE) 250 MG tablet; Take 2 tablets by mouth on day 1; Take 1 tablet by mouth on days 2-5  Dispense: 6 tablet; Refill: 0    Sore throat  -     POCT Strep A, Molecular

## 2025-06-18 ENCOUNTER — TELEPHONE (OUTPATIENT)
Dept: FAMILY MEDICINE | Facility: CLINIC | Age: 75
End: 2025-06-18
Payer: MEDICARE

## 2025-06-18 NOTE — TELEPHONE ENCOUNTER
I sent a message to Ana Terrance to reschedule her AWV for 06/19/2025 but I am also sending this to you Jenny because you can go ahead and take her off for tomorrow.  I assume the patient realizes that this is an AWV and not a regular appointment.

## 2025-06-18 NOTE — TELEPHONE ENCOUNTER
Copied from CRM #[id number]. Topic: Appointments - Appointment Rescheduling  >> Jun 18, 2025  4:25 PM Hailee wrote:  Patient stated she realized she has to work at 930 for 6/19 and would need something earlier or later in the afternoon on Wednesday. Would like appointment pushed out a few weeks.

## 2025-06-19 ENCOUNTER — OFFICE VISIT (OUTPATIENT)
Dept: FAMILY MEDICINE | Facility: CLINIC | Age: 75
End: 2025-06-19
Payer: MEDICARE

## 2025-06-19 VITALS
TEMPERATURE: 98 F | DIASTOLIC BLOOD PRESSURE: 66 MMHG | RESPIRATION RATE: 16 BRPM | HEIGHT: 65 IN | OXYGEN SATURATION: 97 % | BODY MASS INDEX: 22.66 KG/M2 | SYSTOLIC BLOOD PRESSURE: 122 MMHG | WEIGHT: 136 LBS | HEART RATE: 74 BPM

## 2025-06-19 DIAGNOSIS — K21.9 GASTROESOPHAGEAL REFLUX DISEASE WITHOUT ESOPHAGITIS: Chronic | ICD-10-CM

## 2025-06-19 DIAGNOSIS — R73.03 PREDIABETES: Chronic | ICD-10-CM

## 2025-06-19 DIAGNOSIS — N18.32 CHRONIC KIDNEY DISEASE, STAGE 3B: Chronic | ICD-10-CM

## 2025-06-19 DIAGNOSIS — J45.991 COUGH VARIANT ASTHMA: Chronic | ICD-10-CM

## 2025-06-19 DIAGNOSIS — Z00.00 ENCOUNTER FOR SUBSEQUENT ANNUAL WELLNESS VISIT (AWV) IN MEDICARE PATIENT: Primary | ICD-10-CM

## 2025-06-19 DIAGNOSIS — E78.49 OTHER HYPERLIPIDEMIA: Chronic | ICD-10-CM

## 2025-06-19 PROCEDURE — G0439 PPPS, SUBSEQ VISIT: HCPCS | Mod: ,,, | Performed by: NURSE PRACTITIONER

## 2025-06-19 NOTE — ASSESSMENT & PLAN NOTE
Lab Results   Component Value Date    HGBA1C 6.0 05/20/2025    HGBA1C 5.9 12/03/2024    HGBA1C 6.2 04/24/2024     A1c stable and improving with lifestyle changes.

## 2025-06-19 NOTE — ASSESSMENT & PLAN NOTE
BMP  Lab Results   Component Value Date     05/20/2025    K 4.5 05/20/2025     05/20/2025    CO2 23 05/20/2025    BUN 31 (H) 05/20/2025    CREATININE 1.17 (H) 05/20/2025    CALCIUM 9.8 05/20/2025    ANIONGAP 18 (H) 05/20/2025    EGFRNORACEVR 49 (L) 05/20/2025     Patient is in a hurry today and will come back by for KAYLA and BMP.

## 2025-06-19 NOTE — PROGRESS NOTES
" [facility]  [address] [place] [address]  Phone: [phone]  Fax: [phone]     PATIENT NAME: Lynne Grimaldo   : 1950    AGE: 74 y.o. DATE OF ENCOUNTER: 25    Provider:    MRN: [id number]      Lynne Grimaldo presented for a follow-up Medicare AWV today. The following components were reviewed and updated:    Medical history  Family History  Social history  Allergies and Current Medications  Health Risk Assessment  Health Maintenance  Care Team    **See Completed Assessments for Annual Wellness visit with in the encounter summary    The following assessments were completed:  Depression Screening  Cognitive function Screening  Timed Get Up Test  Whisper Test      Opioid documentation:      Patient does not have a current opioid prescription.          Vitals:    25 0835   BP: 122/66   BP Location: Right arm   Patient Position: Sitting   Pulse: 74   Resp: 16   Temp: 98.1 °F (36.7 °C)   TempSrc: Oral   SpO2: 97%   Weight: 61.7 kg (136 lb)   Height: 5' 4.5" (1.638 m)     Body mass index is 22.98 kg/m².       Physical Exam  Vitals and nursing note reviewed.   Constitutional:       General: She is not in acute distress.     Appearance: Normal appearance. She is not ill-appearing.   HENT:      Head: Normocephalic.   Eyes:      Conjunctiva/sclera: Conjunctivae normal.   Cardiovascular:      Rate and Rhythm: Normal rate and regular rhythm.      Heart sounds: Normal heart sounds.   Pulmonary:      Effort: Pulmonary effort is normal. No respiratory distress.      Breath sounds: Normal breath sounds.   Skin:     General: Skin is warm and dry.      Coloration: Skin is not jaundiced or pale.   Neurological:      Mental Status: She is alert and oriented to person, place, and time.      Gait: Gait normal.   Psychiatric:         Mood and Affect: Mood normal.         Behavior: Behavior normal.         Thought Content: Thought content normal.         Judgment: " Judgment normal.         Diagnoses and health risks identified today and associated recommendations/orders:  1. Encounter for subsequent annual wellness visit (AWV) in Medicare patient    2. Other hyperlipidemia  Assessment & Plan:  Lab Results   Component Value Date    CHOL 216 (H) 04/24/2024    CHOL 220 (H) 11/08/2022     Lab Results   Component Value Date    HDL 60 04/24/2024    HDL 59 11/08/2022     Lab Results   Component Value Date    LDLCALC 132 04/24/2024    LDLCALC 141 11/08/2022     Lab Results   Component Value Date    TRIG 120 04/24/2024    TRIG 99 11/08/2022       Lab Results   Component Value Date    CHOLHDL 3.6 04/24/2024    CHOLHDL 3.7 11/08/2022     Refuses statin.  Declines lipid screening update.      3. Prediabetes  Overview:  8/7/18 A1c 6.5%, didn't take med; Nov 2018 6.1%; Dec 2020 6.0%    Assessment & Plan:  Lab Results   Component Value Date    HGBA1C 6.0 05/20/2025    HGBA1C 5.9 12/03/2024    HGBA1C 6.2 04/24/2024     A1c stable and improving with lifestyle changes.      4. Chronic kidney disease, stage 3b  Overview:  GFR 44 at 1st visit 8/1/18    Assessment & Plan:  BMP  Lab Results   Component Value Date     05/20/2025    K 4.5 05/20/2025     05/20/2025    CO2 23 05/20/2025    BUN 31 (H) 05/20/2025    CREATININE 1.17 (H) 05/20/2025    CALCIUM 9.8 05/20/2025    ANIONGAP 18 (H) 05/20/2025    EGFRNORACEVR 49 (L) 05/20/2025     Patient is in a hurry today and will come back by for KAYLA and BMP.    Orders:  -     Basic Metabolic Panel; Future; Expected date: 06/19/2025  -     Microalbumin/Creatinine Ratio, Urine; Future; Expected date: 06/19/2025    5. Gastroesophageal reflux disease without esophagitis  Assessment & Plan:  Stable with diet changes      6. BMI 22.0-22.9, adult    7. Cough variant asthma  Assessment & Plan:  Stable, no treatment needed.         Health Maintenance Due   Topic Date Due    Pneumococcal Vaccines (Age 50+) (1 of 2 - PCV) Never done    RSV Vaccine (Age 60+  and Pregnant patients) (1 - Risk 60-74 years 1-dose series) Never done    Annual UACr  06/19/2025    Mammogram  07/30/2025      Pneumonia vaccine - declined, RSV vaccine - declined, Tetanus vaccine - declined, Shingles vaccine - declined, Covid vaccine - declined, Annual UACr - will order next visit, CMP - will order next visit, Lipid panel - ordered this visit, Mammogram -appointment scheduled for 8/5/25.    Provided Lynne with a 5-10 year written screening schedule and personal prevention plan. Recommendations were developed using the USPSTF age appropriate recommendations. Education, counseling, and referrals were provided as needed.  After Visit Summary printed and given to patient which includes a list of additional screenings\tests needed.      I offered to discuss advanced care planning, including how to pick a person who would make decisions for you if you were unable to make them for yourself, called a health care power of , and what kind of decisions you might make such as use of life sustaining treatments such as ventilators and tube feeding when faced with a life limiting illness recorded on a living will that they will need to know. (How you want to be cared for as you near the end of your natural life)     X Patient is interested in learning more about how to make advanced directives.  I provided them paperwork and offered to discuss this with them.      Follow up in about 1 year (around 6/19/2026) for Medicare AWV, and otherwise follow-up as routinely scheduled.    Signature:  SHANNAN Mane-BC    Future Appointments   Date Time Provider Department Center   8/5/2025  2:40 PM [facility]   1/5/2026 10:20 AM Kristyn Tavera FNP [facility] [place]   6/24/2026  9:00 AM AWV NURSE, [facility] [place]      surgery resident

## 2025-06-19 NOTE — ASSESSMENT & PLAN NOTE
Lab Results   Component Value Date    CHOL 216 (H) 04/24/2024    CHOL 220 (H) 11/08/2022     Lab Results   Component Value Date    HDL 60 04/24/2024    HDL 59 11/08/2022     Lab Results   Component Value Date    LDLCALC 132 04/24/2024    LDLCALC 141 11/08/2022     Lab Results   Component Value Date    TRIG 120 04/24/2024    TRIG 99 11/08/2022       Lab Results   Component Value Date    CHOLHDL 3.6 04/24/2024    CHOLHDL 3.7 11/08/2022     Refuses statin.  Declines lipid screening update.

## 2025-06-19 NOTE — PATIENT INSTRUCTIONS
Counseling and Referral of Other Preventative  (Italic type indicates deductible and co-insurance are waived)    Patient Name: Lynne Grimaldo  Today's Date: 6/19/2025    Health Maintenance       Date Due Completion Date    Pneumococcal Vaccines (Age 50+) (1 of 2 - PCV) Never done ---    RSV Vaccine (Age 60+ and Pregnant patients) (1 - Risk 60-74 years 1-dose series) Never done ---    Annual UACr 06/19/2025 6/19/2024    Mammogram 07/30/2025 7/30/2024    Colorectal Cancer Screening 10/10/2025 10/10/2022    Non-Diabetic Eye Exam 01/07/2026 1/7/2025    Hemoglobin A1c (Prediabetes) 05/20/2026 5/20/2025    DEXA Scan 07/30/2027 7/30/2024    Lipid Panel 04/24/2029 4/24/2024        No orders of the defined types were placed in this encounter.    The following information is provided to all patients.  This information is to help you find resources for any of the problems found today that may be affecting your health:                  Living healthy guide: [place].gov    Understanding Diabetes: www.diabetes.org      Eating healthy: www.cdc.gov/healthyweight      CDC home safety checklist: www.cdc.gov/steadi/patient.html      Agency on Aging: [place].gov    Alcoholics anonymous (AA): www.aa.org      Physical Activity: www.funmilayo.nih.gov/dx5aqnz      Tobacco use: [place].gov

## 2025-08-05 ENCOUNTER — HOSPITAL ENCOUNTER (OUTPATIENT)
Dept: RADIOLOGY | Facility: HOSPITAL | Age: 75
Discharge: HOME | End: 2025-08-05
Attending: NURSE PRACTITIONER
Payer: MEDICARE

## 2025-08-05 VITALS — BODY MASS INDEX: 21.66 KG/M2 | WEIGHT: 130 LBS | HEIGHT: 65 IN

## 2025-08-05 DIAGNOSIS — Z12.31 OTHER SCREENING MAMMOGRAM: ICD-10-CM

## 2025-08-05 PROCEDURE — 77067 SCR MAMMO BI INCL CAD: CPT | Mod: TC
